# Patient Record
Sex: MALE | Race: WHITE | NOT HISPANIC OR LATINO | Employment: FULL TIME | URBAN - METROPOLITAN AREA
[De-identification: names, ages, dates, MRNs, and addresses within clinical notes are randomized per-mention and may not be internally consistent; named-entity substitution may affect disease eponyms.]

---

## 2019-09-16 ENCOUNTER — OFFICE VISIT (OUTPATIENT)
Dept: FAMILY MEDICINE | Facility: CLINIC | Age: 59
End: 2019-09-16
Payer: COMMERCIAL

## 2019-09-16 VITALS
OXYGEN SATURATION: 96 % | SYSTOLIC BLOOD PRESSURE: 126 MMHG | HEART RATE: 72 BPM | HEIGHT: 73 IN | DIASTOLIC BLOOD PRESSURE: 70 MMHG | BODY MASS INDEX: 33.66 KG/M2 | WEIGHT: 254 LBS

## 2019-09-16 DIAGNOSIS — B35.1 ONYCHOMYCOSIS: ICD-10-CM

## 2019-09-16 DIAGNOSIS — L71.9 ROSACEA: ICD-10-CM

## 2019-09-16 DIAGNOSIS — I10 ESSENTIAL HYPERTENSION: Primary | ICD-10-CM

## 2019-09-16 DIAGNOSIS — F41.1 GENERALIZED ANXIETY DISORDER: ICD-10-CM

## 2019-09-16 DIAGNOSIS — D68.51 FACTOR 5 LEIDEN MUTATION, HETEROZYGOUS: ICD-10-CM

## 2019-09-16 DIAGNOSIS — K29.70 GASTRITIS WITHOUT BLEEDING, UNSPECIFIED CHRONICITY, UNSPECIFIED GASTRITIS TYPE: ICD-10-CM

## 2019-09-16 PROCEDURE — 99214 OFFICE O/P EST MOD 30 MIN: CPT | Mod: S$GLB,,, | Performed by: FAMILY MEDICINE

## 2019-09-16 PROCEDURE — 3008F BODY MASS INDEX DOCD: CPT | Mod: S$GLB,,, | Performed by: FAMILY MEDICINE

## 2019-09-16 PROCEDURE — 99214 PR OFFICE/OUTPT VISIT, EST, LEVL IV, 30-39 MIN: ICD-10-PCS | Mod: S$GLB,,, | Performed by: FAMILY MEDICINE

## 2019-09-16 PROCEDURE — 3008F PR BODY MASS INDEX (BMI) DOCUMENTED: ICD-10-PCS | Mod: S$GLB,,, | Performed by: FAMILY MEDICINE

## 2019-09-16 RX ORDER — HYDROCORTISONE 25 MG/G
CREAM TOPICAL DAILY
Qty: 15 G | Refills: 2 | Status: SHIPPED | OUTPATIENT
Start: 2019-09-16

## 2019-09-16 RX ORDER — LORAZEPAM 0.5 MG/1
1 TABLET ORAL
COMMUNITY
End: 2019-09-17 | Stop reason: SDUPTHER

## 2019-09-16 RX ORDER — HYDROCHLOROTHIAZIDE 25 MG/1
1 TABLET ORAL DAILY
COMMUNITY
Start: 2019-07-17 | End: 2019-09-16 | Stop reason: SDUPTHER

## 2019-09-16 RX ORDER — GARLIC 1000 MG
1 CAPSULE ORAL DAILY
COMMUNITY

## 2019-09-16 RX ORDER — ATENOLOL 50 MG/1
1 TABLET ORAL 2 TIMES DAILY
COMMUNITY
Start: 2019-06-11 | End: 2019-09-16 | Stop reason: SDUPTHER

## 2019-09-16 RX ORDER — LOSARTAN POTASSIUM 50 MG/1
50 TABLET ORAL DAILY
Qty: 90 TABLET | Refills: 1 | Status: SHIPPED | OUTPATIENT
Start: 2019-09-16 | End: 2020-03-16 | Stop reason: SDUPTHER

## 2019-09-16 RX ORDER — IBUPROFEN 100 MG/5ML
1 SUSPENSION, ORAL (FINAL DOSE FORM) ORAL DAILY
COMMUNITY

## 2019-09-16 RX ORDER — PHENYLEPHRINE HCL 10 MG
1 TABLET ORAL DAILY
COMMUNITY

## 2019-09-16 RX ORDER — TITANIUM DIOXIDE, OCTINOXATE, ZINC OXIDE 4.61; 1.6; .78 G/40ML; G/40ML; G/40ML
1 CREAM TOPICAL DAILY
COMMUNITY

## 2019-09-16 RX ORDER — LOSARTAN POTASSIUM 50 MG/1
1 TABLET ORAL DAILY
COMMUNITY
Start: 2019-07-17 | End: 2019-09-16 | Stop reason: SDUPTHER

## 2019-09-16 RX ORDER — OMEPRAZOLE 40 MG/1
40 CAPSULE, DELAYED RELEASE ORAL DAILY
Qty: 90 CAPSULE | Refills: 1 | Status: SHIPPED | OUTPATIENT
Start: 2019-09-16 | End: 2020-09-03 | Stop reason: SDUPTHER

## 2019-09-16 RX ORDER — TURMERIC 100 %
1 POWDER (GRAM) MISCELLANEOUS DAILY
COMMUNITY

## 2019-09-16 RX ORDER — HYDROCHLOROTHIAZIDE 25 MG/1
25 TABLET ORAL DAILY
Qty: 90 TABLET | Refills: 1 | Status: SHIPPED | OUTPATIENT
Start: 2019-09-16 | End: 2020-07-01 | Stop reason: SDUPTHER

## 2019-09-16 RX ORDER — AMLODIPINE BESYLATE 10 MG/1
1 TABLET ORAL DAILY
COMMUNITY
Start: 2019-07-17 | End: 2019-09-16 | Stop reason: SDUPTHER

## 2019-09-16 RX ORDER — AMLODIPINE BESYLATE 10 MG/1
10 TABLET ORAL DAILY
Qty: 90 TABLET | Refills: 1 | Status: SHIPPED | OUTPATIENT
Start: 2019-09-16 | End: 2020-03-16 | Stop reason: SDUPTHER

## 2019-09-16 RX ORDER — ATENOLOL 50 MG/1
50 TABLET ORAL 2 TIMES DAILY
Qty: 90 TABLET | Refills: 1 | Status: SHIPPED | OUTPATIENT
Start: 2019-09-16 | End: 2020-03-16 | Stop reason: SDUPTHER

## 2019-09-16 RX ORDER — HYDROCORTISONE ACETATE 0.5 %
1 CREAM (GRAM) TOPICAL DAILY
COMMUNITY

## 2019-09-16 RX ORDER — OMEPRAZOLE 40 MG/1
40 CAPSULE, DELAYED RELEASE ORAL DAILY
Refills: 3 | COMMUNITY
Start: 2019-06-14 | End: 2019-09-16 | Stop reason: SDUPTHER

## 2019-09-17 DIAGNOSIS — F41.1 GENERALIZED ANXIETY DISORDER: Primary | ICD-10-CM

## 2019-09-17 RX ORDER — LORAZEPAM 0.5 MG/1
0.5 TABLET ORAL
Qty: 30 TABLET | Refills: 3 | Status: SHIPPED | OUTPATIENT
Start: 2019-09-17 | End: 2020-03-16 | Stop reason: SDUPTHER

## 2019-09-17 NOTE — TELEPHONE ENCOUNTER
----- Message from Melita Carney sent at 9/17/2019  1:50 PM CDT -----  vm- pt said that not all of his rx's were called in yesterday . Please give him a call

## 2019-10-16 ENCOUNTER — TELEPHONE (OUTPATIENT)
Dept: FAMILY MEDICINE | Facility: CLINIC | Age: 59
End: 2019-10-16

## 2019-12-17 ENCOUNTER — OFFICE VISIT (OUTPATIENT)
Dept: FAMILY MEDICINE | Facility: CLINIC | Age: 59
End: 2019-12-17
Payer: COMMERCIAL

## 2019-12-17 VITALS
SYSTOLIC BLOOD PRESSURE: 136 MMHG | WEIGHT: 246 LBS | BODY MASS INDEX: 32.6 KG/M2 | HEART RATE: 80 BPM | HEIGHT: 73 IN | DIASTOLIC BLOOD PRESSURE: 84 MMHG

## 2019-12-17 DIAGNOSIS — K21.9 GASTROESOPHAGEAL REFLUX DISEASE WITHOUT ESOPHAGITIS: ICD-10-CM

## 2019-12-17 DIAGNOSIS — I10 ESSENTIAL HYPERTENSION: Primary | ICD-10-CM

## 2019-12-17 DIAGNOSIS — D68.51 FACTOR 5 LEIDEN MUTATION, HETEROZYGOUS: ICD-10-CM

## 2019-12-17 DIAGNOSIS — F41.1 GENERALIZED ANXIETY DISORDER: ICD-10-CM

## 2019-12-17 PROBLEM — K29.70 GASTRITIS: Status: RESOLVED | Noted: 2019-09-16 | Resolved: 2019-12-17

## 2019-12-17 PROCEDURE — 3008F PR BODY MASS INDEX (BMI) DOCUMENTED: ICD-10-PCS | Mod: S$GLB,,, | Performed by: FAMILY MEDICINE

## 2019-12-17 PROCEDURE — 3008F BODY MASS INDEX DOCD: CPT | Mod: S$GLB,,, | Performed by: FAMILY MEDICINE

## 2019-12-17 PROCEDURE — 99214 OFFICE O/P EST MOD 30 MIN: CPT | Mod: S$GLB,,, | Performed by: FAMILY MEDICINE

## 2019-12-17 PROCEDURE — 99214 PR OFFICE/OUTPT VISIT, EST, LEVL IV, 30-39 MIN: ICD-10-PCS | Mod: S$GLB,,, | Performed by: FAMILY MEDICINE

## 2019-12-17 NOTE — PROGRESS NOTES
SUBJECTIVE:    Patient ID: Otto Dawson is a 59 y.o. male.    Chief Complaint: Follow-up (did not bring bottles, states he has enough to last until the next OV -ac )    This 59-year-old male complains of some sinus congestion.  He has been using saline flushes to his nose and has had minimal discharge. He denies any fevers chills or cough.    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  He does not do much exercise outside of his job.  He is willing to go join a gym with a friend.    He has a history of a anxiety disorder with panic attacks.  He uses lorazepam only rarely for panic attacks.    He follows up yearly for prostate exams with  who does the PSA tests      No visits with results within 6 Month(s) from this visit.   Latest known visit with results is:   No results found for any previous visit.       Past Medical History:   Diagnosis Date    Anxiety     Arthritis     Depression     Factor 5 Leiden mutation, heterozygous     Gastritis 9/16/2019    Generalized anxiety disorder     History of nuclear stress test 2014    Hyperlipidemia     Hypertension      Past Surgical History:   Procedure Laterality Date    COLONOSCOPY  2018    Dr Don-rtc 5 yrs     HERNIA REPAIR      SINUS SURGERY  2018    nasal septoplasty/balloon sinuplasty- Dr Roman     Family History   Problem Relation Age of Onset    Heart disease Father        Marital Status:   Alcohol History:  reports that he drinks alcohol.  Tobacco History:  reports that he quit smoking about 21 years ago. His smoking use included cigarettes. He has never used smokeless tobacco.  Drug History:  reports that he does not use drugs.    Review of patient's allergies indicates:   Allergen Reactions    Cefuroxime axetil     Ciprofloxacin Other (See Comments)    Sulfacetamide sodium-sulfur Other (See Comments)    Tetracycline hcl Other (See Comments)       Current  Outpatient Medications:     amLODIPine (NORVASC) 10 MG tablet, Take 1 tablet (10 mg total) by mouth once daily., Disp: 90 tablet, Rfl: 1    ascorbic acid, vitamin C, (VITAMIN C) 1000 MG tablet, 1 tablet once daily., Disp: , Rfl:     atenolol (TENORMIN) 50 MG tablet, Take 1 tablet (50 mg total) by mouth 2 (two) times daily., Disp: 90 tablet, Rfl: 1    CALCIUM-MAGNESIUM ORAL, 1 tablet Daily., Disp: , Rfl:     cinnamon bark (CINNAMON) 500 mg capsule, 1 tablet Daily., Disp: , Rfl:     cranberry 400 mg Cap, 1 capsule once daily., Disp: , Rfl:     fluoxetine (PROZAC) 20 MG capsule, Take 1 capsule (20 mg total) by mouth once daily., Disp: 90 capsule, Rfl: 3    garlic 1,000 mg Cap, 1 capsule once daily., Disp: , Rfl:     glucosam reed dip-chondroit-C-Mn 585-236-45-3 mg Cap, 1 tablet once daily., Disp: , Rfl:     hydroCHLOROthiazide (HYDRODIURIL) 25 MG tablet, Take 1 tablet (25 mg total) by mouth once daily., Disp: 90 tablet, Rfl: 1    hydrocortisone 2.5 % cream, Apply topically once daily., Disp: 15 g, Rfl: 2    LORazepam (ATIVAN) 0.5 MG tablet, Take 1 tablet (0.5 mg total) by mouth as needed., Disp: 30 tablet, Rfl: 3    losartan (COZAAR) 50 MG tablet, Take 1 tablet (50 mg total) by mouth once daily., Disp: 90 tablet, Rfl: 1    mv-mins-folic-lycopene-ginkgo (ONE-A-DAY MEN 50 PLUS, GINKGO,) 400-300-120 mcg-mcg-mg Tab, 1 tablet once daily., Disp: , Rfl:     omeprazole (PRILOSEC) 40 MG capsule, Take 1 capsule (40 mg total) by mouth once daily., Disp: 90 capsule, Rfl: 1    PHYTOSTEROL ORAL, 1 tablet once daily., Disp: , Rfl:     turmeric, bulk, 100 % Powd, 1 tablet once daily., Disp: , Rfl:     vitamin B comp with C no.4 (SUPER B COMPLEX + C) 150 mg Tab, 1 tablet once daily., Disp: , Rfl:     Review of Systems   Constitutional: Negative for appetite change, chills, fatigue, fever and unexpected weight change.   HENT: Negative for congestion, ear pain and trouble swallowing.    Eyes: Negative for pain, discharge  "and visual disturbance.   Respiratory: Negative for apnea, cough, shortness of breath and wheezing.    Cardiovascular: Negative for chest pain and leg swelling.        Fleeting  Chest pains    Gastrointestinal: Negative for abdominal pain, blood in stool, constipation, diarrhea, nausea and vomiting.   Endocrine: Negative for cold intolerance, heat intolerance and polydipsia.   Genitourinary: Negative for dysuria, hematuria, testicular pain and urgency.        Nocturia 1  X nite, sees Dr Monique for PSA   Musculoskeletal: Negative for gait problem, joint swelling and myalgias.   Neurological: Negative for dizziness, seizures and numbness.   Psychiatric/Behavioral: Negative for agitation, behavioral problems and hallucinations. The patient is not nervous/anxious.           Objective:      Vitals:    12/17/19 1429   BP: 136/84   Pulse: 80   Weight: 111.6 kg (246 lb)   Height: 6' 1" (1.854 m)     Body mass index is 32.46 kg/m².  Physical Exam   Constitutional: He is oriented to person, place, and time. He appears well-developed and well-nourished.   HENT:   Head: Normocephalic and atraumatic.   Right Ear: External ear normal.   Left Ear: External ear normal.   Nose: Nose normal.   Mouth/Throat: Oropharynx is clear and moist.   Eyes: Pupils are equal, round, and reactive to light. EOM are normal.   Neck: Normal range of motion. Neck supple. Carotid bruit is not present. No thyromegaly present.   Cardiovascular: Normal rate, regular rhythm, normal heart sounds and intact distal pulses.   No murmur heard.  Pulmonary/Chest: Effort normal and breath sounds normal. He has no wheezes. He has no rales.   Abdominal: Soft. Bowel sounds are normal. He exhibits no distension. There is no hepatosplenomegaly. There is no tenderness.   Musculoskeletal: Normal range of motion. He exhibits no tenderness or deformity.        Lumbar back: Normal. He exhibits no pain and no spasm.   Bends 90 degrees at  waist, shoulders have full range " of motion, knees are crepitant bilaterally.  There is no pitting edema peripherally   Lymphadenopathy:     He has no cervical adenopathy.   Neurological: He is alert and oriented to person, place, and time. No cranial nerve deficit. Coordination normal.   Skin: Skin is warm and dry. No rash noted.   Psychiatric: He has a normal mood and affect. His behavior is normal. Judgment and thought content normal.   Nursing note and vitals reviewed.        Assessment:       1. Essential hypertension    2. Generalized anxiety disorder    3. Factor 5 Leiden mutation, heterozygous    4. Gastroesophageal reflux disease without esophagitis         Plan:       Essential hypertension  Well controlled on current medications  Generalized anxiety disorder  Continue p.r.n. use of lorazepam.  Continue fluoxetine  Factor 5 Leiden mutation, heterozygous  He has never had a blood clot  Gastroesophageal reflux disease without esophagitis  Using omeprazole only rarely  Repeat lab work in March.  Follow up in about 3 months (around 3/17/2020) for Hypertension.

## 2020-03-16 DIAGNOSIS — F41.1 GENERALIZED ANXIETY DISORDER: ICD-10-CM

## 2020-03-16 DIAGNOSIS — I10 ESSENTIAL HYPERTENSION: ICD-10-CM

## 2020-03-16 RX ORDER — LORAZEPAM 0.5 MG/1
0.5 TABLET ORAL
Qty: 30 TABLET | Refills: 3 | Status: SHIPPED | OUTPATIENT
Start: 2020-03-16 | End: 2020-09-03 | Stop reason: SDUPTHER

## 2020-03-16 RX ORDER — ATENOLOL 50 MG/1
50 TABLET ORAL 2 TIMES DAILY
Qty: 90 TABLET | Refills: 1 | Status: SHIPPED | OUTPATIENT
Start: 2020-03-16 | End: 2020-04-23 | Stop reason: SDUPTHER

## 2020-03-16 RX ORDER — AMLODIPINE BESYLATE 10 MG/1
10 TABLET ORAL DAILY
Qty: 90 TABLET | Refills: 1 | Status: SHIPPED | OUTPATIENT
Start: 2020-03-16 | End: 2020-09-03 | Stop reason: SDUPTHER

## 2020-03-16 RX ORDER — LOSARTAN POTASSIUM 50 MG/1
50 TABLET ORAL DAILY
Qty: 90 TABLET | Refills: 1 | Status: SHIPPED | OUTPATIENT
Start: 2020-03-16 | End: 2020-09-03 | Stop reason: SDUPTHER

## 2020-03-16 NOTE — TELEPHONE ENCOUNTER
----- Message from Desiree Traore sent at 3/16/2020 11:46 AM CDT -----  Pt cancelled needs refills on Atenolol, HCL, Losartan, Amlodipine.  He is completely out of Atenolol but would like more fills on all with 90 days supply [facility] ([facility] [address]).  Refill on Ativan due to high stress also to [facility]   # [phone]

## 2020-04-20 ENCOUNTER — TELEPHONE (OUTPATIENT)
Dept: FAMILY MEDICINE | Facility: CLINIC | Age: 60
End: 2020-04-20

## 2020-04-22 ENCOUNTER — TELEPHONE (OUTPATIENT)
Dept: FAMILY MEDICINE | Facility: CLINIC | Age: 60
End: 2020-04-22

## 2020-04-22 NOTE — TELEPHONE ENCOUNTER
Pt stated if the virtual visit does not work in the morning to please give him a call on [phone] so he could still have his visit

## 2020-04-23 ENCOUNTER — OFFICE VISIT (OUTPATIENT)
Dept: FAMILY MEDICINE | Facility: CLINIC | Age: 60
End: 2020-04-23

## 2020-04-23 DIAGNOSIS — I10 ESSENTIAL HYPERTENSION: ICD-10-CM

## 2020-04-23 DIAGNOSIS — K29.70 GASTRITIS WITHOUT BLEEDING, UNSPECIFIED CHRONICITY, UNSPECIFIED GASTRITIS TYPE: ICD-10-CM

## 2020-04-23 DIAGNOSIS — F41.1 GENERALIZED ANXIETY DISORDER: Primary | ICD-10-CM

## 2020-04-23 PROCEDURE — 99213 OFFICE O/P EST LOW 20 MIN: CPT | Mod: 95,,, | Performed by: PHYSICIAN ASSISTANT

## 2020-04-23 PROCEDURE — 99213 PR OFFICE/OUTPT VISIT, EST, LEVL III, 20-29 MIN: ICD-10-PCS | Mod: 95,,, | Performed by: PHYSICIAN ASSISTANT

## 2020-04-23 RX ORDER — ATENOLOL 50 MG/1
50 TABLET ORAL 2 TIMES DAILY
Qty: 90 TABLET | Refills: 1 | Status: SHIPPED | OUTPATIENT
Start: 2020-04-23 | End: 2020-05-26 | Stop reason: SDUPTHER

## 2020-04-23 RX ORDER — UBIDECARENONE 30 MG
30 CAPSULE ORAL 3 TIMES DAILY
COMMUNITY

## 2020-04-23 RX ORDER — CHOLECALCIFEROL (VITAMIN D3) 25 MCG
1000 TABLET ORAL DAILY
COMMUNITY

## 2020-04-23 NOTE — PATIENT INSTRUCTIONS

## 2020-04-23 NOTE — PROGRESS NOTES
Subjective:        The chief complaint leading to consultation is: 6 month checkup  The patient location is:  Home  Visit type: Virtual visit with synchronous audio/video or audio only  This was a video visit in lieu of in-person visit due to the coronavirus emergency. Patient acknowledged and consented to the video visit encounter.     58 yo wm presents for regular checkup. Via Virtual telemed conference. He reports that he is still able to work at his [employer]. Does gardening and small chores. Not in close proximity to anyone. He reports that he has been compliant with medications as prescribed. Does report some right hip pain for which he has been using some IBU. Considering seeing Chiro. Still talking with his therapist once a week now as well. Virtually. Needs refills of atenolol.      Past Surgical History:   Procedure Laterality Date    COLONOSCOPY  2018    Dr Don-rtc 5 yrs     HERNIA REPAIR      SINUS SURGERY  2018    nasal septoplasty/balloon sinuplasty- Dr Roman     Past Medical History:   Diagnosis Date    Anxiety     Arthritis     Depression     Factor 5 Leiden mutation, heterozygous     Gastritis 9/16/2019    Generalized anxiety disorder     History of nuclear stress test 2014    Hyperlipidemia     Hypertension      Family History   Problem Relation Age of Onset    Heart disease Father         Social History:   Marital Status: Single  Alcohol History:  reports that he drinks alcohol.  Tobacco History:  reports that he quit smoking about 22 years ago. His smoking use included cigarettes. He has never used smokeless tobacco.  Drug History:  reports that he does not use drugs.    Review of patient's allergies indicates:   Allergen Reactions    Cefuroxime axetil     Ciprofloxacin Other (See Comments)    Sulfacetamide sodium-sulfur Other (See Comments)    Tetracycline hcl Other (See Comments)       Current Outpatient Medications   Medication Sig Dispense Refill    amLODIPine (NORVASC)  10 MG tablet Take 1 tablet (10 mg total) by mouth once daily. 90 tablet 1    ascorbic acid, vitamin C, (VITAMIN C) 1000 MG tablet 1 tablet once daily.      atenoloL (TENORMIN) 50 MG tablet Take 1 tablet (50 mg total) by mouth 2 (two) times daily. 90 tablet 1    CALCIUM-MAGNESIUM ORAL 1 tablet Daily.      cinnamon bark (CINNAMON) 500 mg capsule 1 tablet Daily.      co-enzyme Q-10 30 mg capsule Take 30 mg by mouth 3 (three) times daily.      cranberry 400 mg Cap 1 capsule once daily.      garlic 1,000 mg Cap 1 capsule once daily.      hydroCHLOROthiazide (HYDRODIURIL) 25 MG tablet Take 1 tablet (25 mg total) by mouth once daily. 90 tablet 1    hydrocortisone 2.5 % cream Apply topically once daily. 15 g 2    LORazepam (ATIVAN) 0.5 MG tablet Take 1 tablet (0.5 mg total) by mouth as needed. 30 tablet 3    losartan (COZAAR) 50 MG tablet Take 1 tablet (50 mg total) by mouth once daily. 90 tablet 1    mv-mins-folic-lycopene-ginkgo (ONE-A-DAY MEN 50 PLUS, GINKGO,) 400-300-120 mcg-mcg-mg Tab 1 tablet once daily.      omeprazole (PRILOSEC) 40 MG capsule Take 1 capsule (40 mg total) by mouth once daily. 90 capsule 1    PHYTOSTEROL ORAL 1 tablet once daily.      turmeric, bulk, 100 % Powd 1 tablet once daily.      vitamin B comp with C no.4 (SUPER B COMPLEX + C) 150 mg Tab 1 tablet once daily.      vitamin D (VITAMIN D3) 1000 units Tab Take 1,000 Units by mouth once daily.      glucosam reed dip-chondroit-C-Mn 805-569-35-3 mg Cap 1 tablet once daily.       No current facility-administered medications for this visit.        Review of Systems   Constitutional: Negative for activity change, fatigue, fever and unexpected weight change.   HENT: Negative for congestion.    Respiratory: Negative for apnea, cough, chest tightness and shortness of breath.    Cardiovascular: Negative for chest pain and palpitations.   Gastrointestinal: Negative for abdominal distention and abdominal pain.   Genitourinary: Negative for  difficulty urinating and dysuria.   Musculoskeletal: Positive for arthralgias. Negative for back pain.   Neurological: Negative for dizziness and weakness.         Objective:        Physical Exam:   Physical Exam   Constitutional: He appears well-developed and well-nourished. No distress.   HENT:   Head: Normocephalic and atraumatic.   Neck: Normal range of motion.   Pulmonary/Chest: Effort normal. No respiratory distress.            Assessment:       1. Generalized anxiety disorder    2. Gastritis without bleeding, unspecified chronicity, unspecified gastritis type    3. Essential hypertension      Plan:   Generalized anxiety disorder  Comments:  mood and anxiety stable at this time. continue as is.    Gastritis without bleeding, unspecified chronicity, unspecified gastritis type    Essential hypertension  Comments:  Well managed. requests refills. Plans to get labs done ASAP  Orders:  -     atenoloL (TENORMIN) 50 MG tablet; Take 1 tablet (50 mg total) by mouth 2 (two) times daily.  Dispense: 90 tablet; Refill: 1      Follow up in about 6 months (around 10/23/2020) for BP Check-Up.    Total time spent with patient: 20min    Each patient to whom he or she provides medical services by telemedicine is:  (1) informed of the relationship between the physician and patient and the respective role of any other health care provider with respect to management of the patient; and (2) notified that he or she may decline to receive medical services by telemedicine and may withdraw from such care at any time.    This note was created using [employer] voice recognition software that occasionally misinterprets phrases or words.

## 2020-05-26 DIAGNOSIS — I10 ESSENTIAL HYPERTENSION: ICD-10-CM

## 2020-05-26 RX ORDER — ATENOLOL 50 MG/1
50 TABLET ORAL 2 TIMES DAILY
Qty: 90 TABLET | Refills: 1 | Status: SHIPPED | OUTPATIENT
Start: 2020-05-26 | End: 2020-09-03 | Stop reason: SDUPTHER

## 2020-05-26 NOTE — TELEPHONE ENCOUNTER
----- Message from Nader Cortez sent at 5/26/2020 11:06 AM CDT -----  Contact: Otto Dawson  Pt says that he needs refills for 90 day supply on his Atenolol   Send to [facility]  Pt# [phone]

## 2020-07-01 DIAGNOSIS — I10 ESSENTIAL HYPERTENSION: ICD-10-CM

## 2020-07-01 RX ORDER — HYDROCHLOROTHIAZIDE 25 MG/1
25 TABLET ORAL DAILY
Qty: 90 TABLET | Refills: 1 | Status: SHIPPED | OUTPATIENT
Start: 2020-07-01 | End: 2020-09-03 | Stop reason: SDUPTHER

## 2020-07-01 NOTE — TELEPHONE ENCOUNTER
----- Message from Talia Bowman sent at 7/1/2020  9:51 AM CDT -----  Regarding: refills  hydroCHLOROthiazide   [facility]   [phone]

## 2020-09-03 ENCOUNTER — OFFICE VISIT (OUTPATIENT)
Dept: FAMILY MEDICINE | Facility: CLINIC | Age: 60
End: 2020-09-03
Payer: COMMERCIAL

## 2020-09-03 VITALS
TEMPERATURE: 98 F | SYSTOLIC BLOOD PRESSURE: 126 MMHG | HEIGHT: 73 IN | HEART RATE: 76 BPM | BODY MASS INDEX: 32.47 KG/M2 | DIASTOLIC BLOOD PRESSURE: 84 MMHG | WEIGHT: 245 LBS

## 2020-09-03 DIAGNOSIS — D68.51 FACTOR 5 LEIDEN MUTATION, HETEROZYGOUS: ICD-10-CM

## 2020-09-03 DIAGNOSIS — K21.9 GASTROESOPHAGEAL REFLUX DISEASE WITHOUT ESOPHAGITIS: ICD-10-CM

## 2020-09-03 DIAGNOSIS — L74.0 HEAT RASH: ICD-10-CM

## 2020-09-03 DIAGNOSIS — K29.70 GASTRITIS WITHOUT BLEEDING, UNSPECIFIED CHRONICITY, UNSPECIFIED GASTRITIS TYPE: ICD-10-CM

## 2020-09-03 DIAGNOSIS — F41.1 GENERALIZED ANXIETY DISORDER: ICD-10-CM

## 2020-09-03 DIAGNOSIS — K58.9 IRRITABLE BOWEL SYNDROME WITHOUT DIARRHEA: Primary | ICD-10-CM

## 2020-09-03 DIAGNOSIS — I10 ESSENTIAL HYPERTENSION: ICD-10-CM

## 2020-09-03 PROCEDURE — 3079F DIAST BP 80-89 MM HG: CPT | Mod: S$GLB,,, | Performed by: FAMILY MEDICINE

## 2020-09-03 PROCEDURE — 99213 OFFICE O/P EST LOW 20 MIN: CPT | Mod: S$GLB,,, | Performed by: FAMILY MEDICINE

## 2020-09-03 PROCEDURE — 3074F PR MOST RECENT SYSTOLIC BLOOD PRESSURE < 130 MM HG: ICD-10-PCS | Mod: S$GLB,,, | Performed by: FAMILY MEDICINE

## 2020-09-03 PROCEDURE — 3008F BODY MASS INDEX DOCD: CPT | Mod: S$GLB,,, | Performed by: FAMILY MEDICINE

## 2020-09-03 PROCEDURE — 3079F PR MOST RECENT DIASTOLIC BLOOD PRESSURE 80-89 MM HG: ICD-10-PCS | Mod: S$GLB,,, | Performed by: FAMILY MEDICINE

## 2020-09-03 PROCEDURE — 99213 PR OFFICE/OUTPT VISIT, EST, LEVL III, 20-29 MIN: ICD-10-PCS | Mod: S$GLB,,, | Performed by: FAMILY MEDICINE

## 2020-09-03 PROCEDURE — 3074F SYST BP LT 130 MM HG: CPT | Mod: S$GLB,,, | Performed by: FAMILY MEDICINE

## 2020-09-03 PROCEDURE — 3008F PR BODY MASS INDEX (BMI) DOCUMENTED: ICD-10-PCS | Mod: S$GLB,,, | Performed by: FAMILY MEDICINE

## 2020-09-03 RX ORDER — ATENOLOL 50 MG/1
50 TABLET ORAL 2 TIMES DAILY
Qty: 90 TABLET | Refills: 1 | Status: SHIPPED | OUTPATIENT
Start: 2020-09-03 | End: 2020-12-28 | Stop reason: SDUPTHER

## 2020-09-03 RX ORDER — HYDROCHLOROTHIAZIDE 25 MG/1
25 TABLET ORAL DAILY
Qty: 90 TABLET | Refills: 1 | Status: SHIPPED | OUTPATIENT
Start: 2020-09-03 | End: 2021-03-10 | Stop reason: SDUPTHER

## 2020-09-03 RX ORDER — LORAZEPAM 0.5 MG/1
0.5 TABLET ORAL
Qty: 30 TABLET | Refills: 3 | Status: SHIPPED | OUTPATIENT
Start: 2020-09-03 | End: 2021-03-10 | Stop reason: SDUPTHER

## 2020-09-03 RX ORDER — DICYCLOMINE HYDROCHLORIDE 20 MG/1
20 TABLET ORAL 3 TIMES DAILY
Qty: 60 TABLET | Refills: 2 | Status: SHIPPED | OUTPATIENT
Start: 2020-09-03 | End: 2020-10-03

## 2020-09-03 RX ORDER — LOSARTAN POTASSIUM 50 MG/1
50 TABLET ORAL DAILY
Qty: 90 TABLET | Refills: 1 | Status: SHIPPED | OUTPATIENT
Start: 2020-09-03 | End: 2021-03-10 | Stop reason: SDUPTHER

## 2020-09-03 RX ORDER — OMEPRAZOLE 40 MG/1
40 CAPSULE, DELAYED RELEASE ORAL DAILY
Qty: 90 CAPSULE | Refills: 1 | Status: SHIPPED | OUTPATIENT
Start: 2020-09-03 | End: 2021-03-10 | Stop reason: SDUPTHER

## 2020-09-03 RX ORDER — AMLODIPINE BESYLATE 10 MG/1
10 TABLET ORAL DAILY
Qty: 90 TABLET | Refills: 1 | Status: SHIPPED | OUTPATIENT
Start: 2020-09-03 | End: 2021-03-10 | Stop reason: SDUPTHER

## 2020-09-03 NOTE — PROGRESS NOTES
Subjective:       Patient ID: Otto Dawson is a 59 y.o. male.    Chief Complaint: Irritable Bowel Syndrome (did not bring bottles, only wants refills on certain meds ac)    This 59-year-old male complains of several months worth of lower abdominal pain.  He feels relief after a bowel movement.  His stools have been decreasing in caliber for the last month.  He has 3-4 stools per day but not diarrhea.  He goes every day and does not have constipation or melena.  He was diagnosed with irritable bowel syndrome in the year 2000.  Back 10 he responded well to Bentyl.    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  Complains of groin itching but no red rash.    Review of patient's allergies indicates:   Allergen Reactions    Cefuroxime axetil     Ciprofloxacin Other (See Comments)    [place] Itching    Sulfacetamide sodium-sulfur Other (See Comments)    Tetracycline hcl Other (See Comments)         Current Outpatient Medications:     amLODIPine (NORVASC) 10 MG tablet, Take 1 tablet (10 mg total) by mouth once daily., Disp: 90 tablet, Rfl: 1    ascorbic acid, vitamin C, (VITAMIN C) 1000 MG tablet, 1 tablet once daily., Disp: , Rfl:     atenoloL (TENORMIN) 50 MG tablet, Take 1 tablet (50 mg total) by mouth 2 (two) times daily., Disp: 90 tablet, Rfl: 1    CALCIUM-MAGNESIUM ORAL, 1 tablet Daily., Disp: , Rfl:     cinnamon bark (CINNAMON) 500 mg capsule, 1 tablet Daily., Disp: , Rfl:     co-enzyme Q-10 30 mg capsule, Take 30 mg by mouth 3 (three) times daily., Disp: , Rfl:     cranberry 400 mg Cap, 1 capsule once daily., Disp: , Rfl:     dicyclomine (BENTYL) 20 mg tablet, Take 1 tablet (20 mg total) by mouth 3 (three) times daily., Disp: 60 tablet, Rfl: 2    garlic 1,000 mg Cap, 1 capsule once daily., Disp: , Rfl:     glucosam reed dip-chondroit-C-Mn 211-972-33-3 mg Cap, 1 tablet once daily., Disp: , Rfl:     hydroCHLOROthiazide (HYDRODIURIL) 25 MG tablet, Take 1 tablet (25  mg total) by mouth once daily., Disp: 90 tablet, Rfl: 1    hydrocortisone 2.5 % cream, Apply topically once daily., Disp: 15 g, Rfl: 2    LORazepam (ATIVAN) 0.5 MG tablet, Take 1 tablet (0.5 mg total) by mouth as needed., Disp: 30 tablet, Rfl: 3    losartan (COZAAR) 50 MG tablet, Take 1 tablet (50 mg total) by mouth once daily., Disp: 90 tablet, Rfl: 1    mv-mins-folic-lycopene-ginkgo (ONE-A-DAY MEN 50 PLUS, GINKGO,) 400-300-120 mcg-mcg-mg Tab, 1 tablet once daily., Disp: , Rfl:     omeprazole (PRILOSEC) 40 MG capsule, Take 1 capsule (40 mg total) by mouth once daily., Disp: 90 capsule, Rfl: 1    PHYTOSTEROL ORAL, 1 tablet once daily., Disp: , Rfl:     turmeric, bulk, 100 % Powd, 1 tablet once daily., Disp: , Rfl:     vitamin B comp with C no.4 (SUPER B COMPLEX + C) 150 mg Tab, 1 tablet once daily., Disp: , Rfl:     vitamin D (VITAMIN D3) 1000 units Tab, Take 1,000 Units by mouth once daily., Disp: , Rfl:     No results found for: WBC, HGB, HCT, PLT, CHOL, TRIG, HDL, LDLDIRECT, ALT, AST, NA, K, CL, CREATININE, BUN, CO2, TSH, PSA, INR, GLUF, HGBA1C, MICROALBUR    Review of Systems   Constitutional: Negative for activity change, chills, fatigue, fever and unexpected weight change.   HENT: Negative for congestion, ear discharge, ear pain, sinus pain and sore throat.    Eyes: Negative for pain, discharge, itching and visual disturbance.   Respiratory: Negative for cough, chest tightness, shortness of breath and wheezing.    Cardiovascular: Negative for chest pain, palpitations and leg swelling.   Gastrointestinal: Positive for abdominal distention (bloating), abdominal pain (generalized lower abdominal pain) and nausea. Negative for blood in stool, constipation, diarrhea, rectal pain and vomiting.   Genitourinary: Negative for difficulty urinating, dysuria, flank pain, frequency and hematuria.   Musculoskeletal: Positive for arthralgias (bilateral hands) and back pain (sciatica). Negative for neck pain and neck  stiffness.   Skin: Negative for color change, pallor, rash and wound.   Neurological: Negative for dizziness, syncope, light-headedness, numbness and headaches.   Psychiatric/Behavioral: Negative for agitation and behavioral problems. The patient is not nervous/anxious.        Objective:      Physical Exam  Constitutional:       Appearance: Normal appearance. He is obese.   HENT:      Head: Normocephalic and atraumatic.      Right Ear: Tympanic membrane, ear canal and external ear normal.      Left Ear: Tympanic membrane, ear canal and external ear normal.      Nose: Nose normal.      Mouth/Throat:      Mouth: Mucous membranes are dry.      Pharynx: Oropharynx is clear.   Eyes:      Extraocular Movements: Extraocular movements intact.      Conjunctiva/sclera: Conjunctivae normal.      Pupils: Pupils are equal, round, and reactive to light.   Neck:      Musculoskeletal: Normal range of motion and neck supple.   Cardiovascular:      Rate and Rhythm: Normal rate and regular rhythm.      Pulses: Normal pulses.      Heart sounds: Normal heart sounds.   Pulmonary:      Effort: Pulmonary effort is normal. No respiratory distress.      Breath sounds: Normal breath sounds. No stridor. No wheezing.   Chest:      Chest wall: No tenderness.   Abdominal:      General: Abdomen is flat. Bowel sounds are normal. There is no distension.      Palpations: Abdomen is soft. There is no mass.      Tenderness: There is abdominal tenderness (epigastric).   Musculoskeletal: Normal range of motion.         General: No swelling or tenderness.      Right lower leg: No edema.      Left lower leg: No edema.   Skin:     General: Skin is warm and dry.      Coloration: Skin is not pale.      Findings: No erythema.      Comments: No redness noted in the intertriginous areas of the groin, mild scrotal erythema   Neurological:      Mental Status: He is alert.   Psychiatric:         Mood and Affect: Mood normal.         Behavior: Behavior normal.          Thought Content: Thought content normal.         Judgment: Judgment normal.         Assessment:       1. Irritable bowel syndrome without diarrhea    2. Essential hypertension    3. Generalized anxiety disorder    4. Essential hypertension    5. Gastritis without bleeding, unspecified chronicity, unspecified gastritis type    6. Factor 5 Leiden mutation, heterozygous    7. Gastroesophageal reflux disease without esophagitis    8. Heat rash        Plan:       1.  Irritable bowel syndrome-had bentyl  20 mg p.o. t.i.d.    p.r.n.  2.  Blood pressure well controlled  3.  Anxiety-use lorazepam p.r.n., anxiety can worsen irritable bowel syndrome  8.  Heat  rash-try gold Bond powder or Ammens medicated powder

## 2020-12-28 DIAGNOSIS — I10 ESSENTIAL HYPERTENSION: ICD-10-CM

## 2020-12-28 RX ORDER — ATENOLOL 50 MG/1
50 TABLET ORAL 2 TIMES DAILY
Qty: 90 TABLET | Refills: 1 | Status: SHIPPED | OUTPATIENT
Start: 2020-12-28 | End: 2021-03-10 | Stop reason: SDUPTHER

## 2020-12-28 NOTE — TELEPHONE ENCOUNTER
----- Message from Desiree Traore sent at 12/28/2020 12:13 PM CST -----  Pt calling for refill Atenolol 50 mg qty 180 to [facility] on  [facility] # [phone]

## 2021-02-24 ENCOUNTER — TELEPHONE (OUTPATIENT)
Dept: FAMILY MEDICINE | Facility: CLINIC | Age: 61
End: 2021-02-24

## 2021-02-24 DIAGNOSIS — Z79.899 ENCOUNTER FOR LONG-TERM (CURRENT) USE OF OTHER MEDICATIONS: ICD-10-CM

## 2021-02-24 DIAGNOSIS — I10 ESSENTIAL HYPERTENSION: ICD-10-CM

## 2021-02-24 DIAGNOSIS — Z00.00 ROUTINE GENERAL MEDICAL EXAMINATION AT A HEALTH CARE FACILITY: Primary | ICD-10-CM

## 2021-02-24 DIAGNOSIS — Z12.5 SPECIAL SCREENING FOR MALIGNANT NEOPLASM OF PROSTATE: ICD-10-CM

## 2021-03-05 LAB
ALBUMIN SERPL-MCNC: 4.7 G/DL (ref 3.6–5.1)
ALBUMIN/GLOB SERPL: 1.9 (CALC) (ref 1–2.5)
ALP SERPL-CCNC: 65 U/L (ref 35–144)
ALT SERPL-CCNC: 22 U/L (ref 9–46)
APPEARANCE UR: CLEAR
AST SERPL-CCNC: 18 U/L (ref 10–35)
BACTERIA #/AREA URNS HPF: ABNORMAL /HPF
BACTERIA UR CULT: ABNORMAL
BASOPHILS # BLD AUTO: 30 CELLS/UL (ref 0–200)
BASOPHILS NFR BLD AUTO: 0.5 %
BILIRUB SERPL-MCNC: 1.1 MG/DL (ref 0.2–1.2)
BILIRUB UR QL STRIP: NEGATIVE
BUN SERPL-MCNC: 18 MG/DL (ref 7–25)
BUN/CREAT SERPL: ABNORMAL (CALC) (ref 6–22)
CALCIUM SERPL-MCNC: 9.6 MG/DL (ref 8.6–10.3)
CHLORIDE SERPL-SCNC: 100 MMOL/L (ref 98–110)
CHOLEST SERPL-MCNC: 196 MG/DL
CHOLEST/HDLC SERPL: 5.6 (CALC)
CO2 SERPL-SCNC: 32 MMOL/L (ref 20–32)
COLOR UR: ABNORMAL
CREAT SERPL-MCNC: 1.3 MG/DL (ref 0.7–1.33)
EOSINOPHIL # BLD AUTO: 228 CELLS/UL (ref 15–500)
EOSINOPHIL NFR BLD AUTO: 3.8 %
ERYTHROCYTE [DISTWIDTH] IN BLOOD BY AUTOMATED COUNT: 12.6 % (ref 11–15)
GFRSERPLBLD MDRD-ARVRAT: 60 ML/MIN/1.73M2
GLOBULIN SER CALC-MCNC: 2.5 G/DL (CALC) (ref 1.9–3.7)
GLUCOSE SERPL-MCNC: 109 MG/DL (ref 65–99)
GLUCOSE UR QL STRIP: NEGATIVE
HCT VFR BLD AUTO: 46.6 % (ref 38.5–50)
HDLC SERPL-MCNC: 35 MG/DL
HGB BLD-MCNC: 15.5 G/DL (ref 13.2–17.1)
HGB UR QL STRIP: NEGATIVE
HYALINE CASTS #/AREA URNS LPF: ABNORMAL /LPF
KETONES UR QL STRIP: ABNORMAL
LDLC SERPL CALC-MCNC: 121 MG/DL (CALC)
LEUKOCYTE ESTERASE UR QL STRIP: NEGATIVE
LYMPHOCYTES # BLD AUTO: 1146 CELLS/UL (ref 850–3900)
LYMPHOCYTES NFR BLD AUTO: 19.1 %
MCH RBC QN AUTO: 29.6 PG (ref 27–33)
MCHC RBC AUTO-ENTMCNC: 33.3 G/DL (ref 32–36)
MCV RBC AUTO: 89.1 FL (ref 80–100)
MONOCYTES # BLD AUTO: 570 CELLS/UL (ref 200–950)
MONOCYTES NFR BLD AUTO: 9.5 %
NEUTROPHILS # BLD AUTO: 4026 CELLS/UL (ref 1500–7800)
NEUTROPHILS NFR BLD AUTO: 67.1 %
NITRITE UR QL STRIP: NEGATIVE
NONHDLC SERPL-MCNC: 161 MG/DL (CALC)
PH UR STRIP: 6.5 [PH] (ref 5–8)
PLATELET # BLD AUTO: 248 THOUSAND/UL (ref 140–400)
PMV BLD REES-ECKER: 10.3 FL (ref 7.5–12.5)
POTASSIUM SERPL-SCNC: 3.8 MMOL/L (ref 3.5–5.3)
PROT SERPL-MCNC: 7.2 G/DL (ref 6.1–8.1)
PROT UR QL STRIP: ABNORMAL
PSA SERPL-MCNC: 0.3 NG/ML
RBC # BLD AUTO: 5.23 MILLION/UL (ref 4.2–5.8)
RBC #/AREA URNS HPF: ABNORMAL /HPF
SODIUM SERPL-SCNC: 140 MMOL/L (ref 135–146)
SP GR UR STRIP: 1.02 (ref 1–1.03)
SQUAMOUS #/AREA URNS HPF: ABNORMAL /HPF
TRIGL SERPL-MCNC: 258 MG/DL
TSH SERPL-ACNC: 2.94 MIU/L (ref 0.4–4.5)
WBC # BLD AUTO: 6 THOUSAND/UL (ref 3.8–10.8)
WBC #/AREA URNS HPF: ABNORMAL /HPF

## 2021-03-10 ENCOUNTER — OFFICE VISIT (OUTPATIENT)
Dept: FAMILY MEDICINE | Facility: CLINIC | Age: 61
End: 2021-03-10
Payer: COMMERCIAL

## 2021-03-10 VITALS
HEIGHT: 73 IN | WEIGHT: 247 LBS | BODY MASS INDEX: 32.74 KG/M2 | DIASTOLIC BLOOD PRESSURE: 70 MMHG | SYSTOLIC BLOOD PRESSURE: 118 MMHG | HEART RATE: 60 BPM

## 2021-03-10 DIAGNOSIS — D68.51 FACTOR 5 LEIDEN MUTATION, HETEROZYGOUS: ICD-10-CM

## 2021-03-10 DIAGNOSIS — K29.70 GASTRITIS WITHOUT BLEEDING, UNSPECIFIED CHRONICITY, UNSPECIFIED GASTRITIS TYPE: ICD-10-CM

## 2021-03-10 DIAGNOSIS — B35.6 TINEA CRURIS: ICD-10-CM

## 2021-03-10 DIAGNOSIS — I10 ESSENTIAL HYPERTENSION: Primary | ICD-10-CM

## 2021-03-10 DIAGNOSIS — K21.9 GASTROESOPHAGEAL REFLUX DISEASE WITHOUT ESOPHAGITIS: ICD-10-CM

## 2021-03-10 DIAGNOSIS — F41.1 GENERALIZED ANXIETY DISORDER: ICD-10-CM

## 2021-03-10 DIAGNOSIS — E78.2 MIXED HYPERLIPIDEMIA: ICD-10-CM

## 2021-03-10 PROCEDURE — 3078F PR MOST RECENT DIASTOLIC BLOOD PRESSURE < 80 MM HG: ICD-10-PCS | Mod: S$GLB,,, | Performed by: FAMILY MEDICINE

## 2021-03-10 PROCEDURE — 3074F PR MOST RECENT SYSTOLIC BLOOD PRESSURE < 130 MM HG: ICD-10-PCS | Mod: S$GLB,,, | Performed by: FAMILY MEDICINE

## 2021-03-10 PROCEDURE — 3008F PR BODY MASS INDEX (BMI) DOCUMENTED: ICD-10-PCS | Mod: S$GLB,,, | Performed by: FAMILY MEDICINE

## 2021-03-10 PROCEDURE — 3008F BODY MASS INDEX DOCD: CPT | Mod: S$GLB,,, | Performed by: FAMILY MEDICINE

## 2021-03-10 PROCEDURE — 99214 PR OFFICE/OUTPT VISIT, EST, LEVL IV, 30-39 MIN: ICD-10-PCS | Mod: S$GLB,,, | Performed by: FAMILY MEDICINE

## 2021-03-10 PROCEDURE — 3078F DIAST BP <80 MM HG: CPT | Mod: S$GLB,,, | Performed by: FAMILY MEDICINE

## 2021-03-10 PROCEDURE — 99214 OFFICE O/P EST MOD 30 MIN: CPT | Mod: S$GLB,,, | Performed by: FAMILY MEDICINE

## 2021-03-10 PROCEDURE — 3074F SYST BP LT 130 MM HG: CPT | Mod: S$GLB,,, | Performed by: FAMILY MEDICINE

## 2021-03-10 RX ORDER — LORAZEPAM 0.5 MG/1
0.5 TABLET ORAL
Qty: 30 TABLET | Refills: 3 | Status: SHIPPED | OUTPATIENT
Start: 2021-03-10 | End: 2022-03-15 | Stop reason: SDUPTHER

## 2021-03-10 RX ORDER — LOSARTAN POTASSIUM 50 MG/1
50 TABLET ORAL DAILY
Qty: 90 TABLET | Refills: 1 | Status: SHIPPED | OUTPATIENT
Start: 2021-03-10 | End: 2021-09-13 | Stop reason: SDUPTHER

## 2021-03-10 RX ORDER — AMLODIPINE BESYLATE 10 MG/1
10 TABLET ORAL DAILY
Qty: 90 TABLET | Refills: 1 | Status: SHIPPED | OUTPATIENT
Start: 2021-03-10 | End: 2021-09-13 | Stop reason: SDUPTHER

## 2021-03-10 RX ORDER — HYDROCHLOROTHIAZIDE 25 MG/1
25 TABLET ORAL DAILY
Qty: 90 TABLET | Refills: 1 | Status: SHIPPED | OUTPATIENT
Start: 2021-03-10 | End: 2021-09-13 | Stop reason: SDUPTHER

## 2021-03-10 RX ORDER — CLOTRIMAZOLE AND BETAMETHASONE DIPROPIONATE 10; .64 MG/G; MG/G
CREAM TOPICAL 2 TIMES DAILY
Qty: 15 G | Refills: 2 | Status: SHIPPED | OUTPATIENT
Start: 2021-03-10 | End: 2021-09-13 | Stop reason: SDUPTHER

## 2021-03-10 RX ORDER — OMEPRAZOLE 40 MG/1
40 CAPSULE, DELAYED RELEASE ORAL DAILY
Qty: 90 CAPSULE | Refills: 1 | Status: SHIPPED | OUTPATIENT
Start: 2021-03-10 | End: 2022-03-15 | Stop reason: SDUPTHER

## 2021-03-10 RX ORDER — ATENOLOL 50 MG/1
50 TABLET ORAL 2 TIMES DAILY
Qty: 180 TABLET | Refills: 1 | Status: SHIPPED | OUTPATIENT
Start: 2021-03-10 | End: 2021-09-13 | Stop reason: SDUPTHER

## 2021-08-26 ENCOUNTER — TELEPHONE (OUTPATIENT)
Dept: FAMILY MEDICINE | Facility: CLINIC | Age: 61
End: 2021-08-26

## 2021-09-13 ENCOUNTER — OFFICE VISIT (OUTPATIENT)
Dept: FAMILY MEDICINE | Facility: CLINIC | Age: 61
End: 2021-09-13
Payer: COMMERCIAL

## 2021-09-13 VITALS
HEART RATE: 68 BPM | HEIGHT: 73 IN | WEIGHT: 238 LBS | DIASTOLIC BLOOD PRESSURE: 70 MMHG | SYSTOLIC BLOOD PRESSURE: 120 MMHG | BODY MASS INDEX: 31.54 KG/M2

## 2021-09-13 DIAGNOSIS — F41.1 GENERALIZED ANXIETY DISORDER: ICD-10-CM

## 2021-09-13 DIAGNOSIS — B35.6 TINEA CRURIS: ICD-10-CM

## 2021-09-13 DIAGNOSIS — E78.2 MIXED HYPERLIPIDEMIA: ICD-10-CM

## 2021-09-13 DIAGNOSIS — K21.9 GASTROESOPHAGEAL REFLUX DISEASE WITHOUT ESOPHAGITIS: ICD-10-CM

## 2021-09-13 DIAGNOSIS — D68.51 FACTOR 5 LEIDEN MUTATION, HETEROZYGOUS: ICD-10-CM

## 2021-09-13 DIAGNOSIS — K58.9 IRRITABLE BOWEL SYNDROME WITHOUT DIARRHEA: ICD-10-CM

## 2021-09-13 DIAGNOSIS — I10 ESSENTIAL HYPERTENSION: Primary | ICD-10-CM

## 2021-09-13 PROCEDURE — 3008F BODY MASS INDEX DOCD: CPT | Mod: S$GLB,,, | Performed by: FAMILY MEDICINE

## 2021-09-13 PROCEDURE — 99214 OFFICE O/P EST MOD 30 MIN: CPT | Mod: S$GLB,,, | Performed by: FAMILY MEDICINE

## 2021-09-13 PROCEDURE — 3078F PR MOST RECENT DIASTOLIC BLOOD PRESSURE < 80 MM HG: ICD-10-PCS | Mod: S$GLB,,, | Performed by: FAMILY MEDICINE

## 2021-09-13 PROCEDURE — 3074F PR MOST RECENT SYSTOLIC BLOOD PRESSURE < 130 MM HG: ICD-10-PCS | Mod: S$GLB,,, | Performed by: FAMILY MEDICINE

## 2021-09-13 PROCEDURE — 3074F SYST BP LT 130 MM HG: CPT | Mod: S$GLB,,, | Performed by: FAMILY MEDICINE

## 2021-09-13 PROCEDURE — 3078F DIAST BP <80 MM HG: CPT | Mod: S$GLB,,, | Performed by: FAMILY MEDICINE

## 2021-09-13 PROCEDURE — 3008F PR BODY MASS INDEX (BMI) DOCUMENTED: ICD-10-PCS | Mod: S$GLB,,, | Performed by: FAMILY MEDICINE

## 2021-09-13 PROCEDURE — 99214 PR OFFICE/OUTPT VISIT, EST, LEVL IV, 30-39 MIN: ICD-10-PCS | Mod: S$GLB,,, | Performed by: FAMILY MEDICINE

## 2021-09-13 PROCEDURE — 4010F PR ACE/ARB THEARPY RXD/TAKEN: ICD-10-PCS | Mod: S$GLB,,, | Performed by: FAMILY MEDICINE

## 2021-09-13 PROCEDURE — 4010F ACE/ARB THERAPY RXD/TAKEN: CPT | Mod: S$GLB,,, | Performed by: FAMILY MEDICINE

## 2021-09-13 RX ORDER — LANOLIN ALCOHOL/MO/W.PET/CERES
400 CREAM (GRAM) TOPICAL DAILY
COMMUNITY

## 2021-09-13 RX ORDER — CLOTRIMAZOLE AND BETAMETHASONE DIPROPIONATE 10; .64 MG/G; MG/G
CREAM TOPICAL 2 TIMES DAILY
Qty: 30 G | Refills: 2 | Status: SHIPPED | OUTPATIENT
Start: 2021-09-13 | End: 2022-12-13 | Stop reason: SDUPTHER

## 2021-09-13 RX ORDER — HYDROCHLOROTHIAZIDE 25 MG/1
25 TABLET ORAL DAILY
Qty: 90 TABLET | Refills: 1 | Status: SHIPPED | OUTPATIENT
Start: 2021-09-13 | End: 2022-03-15 | Stop reason: SDUPTHER

## 2021-09-13 RX ORDER — ATENOLOL 50 MG/1
50 TABLET ORAL 2 TIMES DAILY
Qty: 180 TABLET | Refills: 1 | Status: SHIPPED | OUTPATIENT
Start: 2021-09-13 | End: 2022-03-15 | Stop reason: SDUPTHER

## 2021-09-13 RX ORDER — AMLODIPINE BESYLATE 10 MG/1
10 TABLET ORAL DAILY
Qty: 90 TABLET | Refills: 1 | Status: SHIPPED | OUTPATIENT
Start: 2021-09-13 | End: 2022-03-15 | Stop reason: SDUPTHER

## 2021-09-13 RX ORDER — LOSARTAN POTASSIUM 50 MG/1
50 TABLET ORAL DAILY
Qty: 90 TABLET | Refills: 1 | Status: SHIPPED | OUTPATIENT
Start: 2021-09-13 | End: 2022-03-15 | Stop reason: SDUPTHER

## 2022-01-06 ENCOUNTER — TELEPHONE (OUTPATIENT)
Dept: FAMILY MEDICINE | Facility: CLINIC | Age: 62
End: 2022-01-06
Payer: COMMERCIAL

## 2022-01-06 NOTE — TELEPHONE ENCOUNTER
Spoke with pt and let him know per Dr. Norman as long as he is starting to feel better no new recommendations.

## 2022-01-06 NOTE — TELEPHONE ENCOUNTER
----- Message from Desiree Traore sent at 1/6/2022  4:04 PM CST -----  Pt calling for medical advice said he had a coughing attack 12/30/2021 woke up 1/1/2022 with fever and chills, headaches, cough said he self quarantined since said he's getting better taking vit c and mucinex but is not feeling his best wants to know what else can he do.  # [phone]

## 2022-03-03 ENCOUNTER — TELEPHONE (OUTPATIENT)
Dept: FAMILY MEDICINE | Facility: CLINIC | Age: 62
End: 2022-03-03
Payer: COMMERCIAL

## 2022-03-03 DIAGNOSIS — Z12.5 SPECIAL SCREENING FOR MALIGNANT NEOPLASM OF PROSTATE: ICD-10-CM

## 2022-03-03 DIAGNOSIS — Z00.00 ROUTINE GENERAL MEDICAL EXAMINATION AT A HEALTH CARE FACILITY: Primary | ICD-10-CM

## 2022-03-03 DIAGNOSIS — I10 ESSENTIAL HYPERTENSION: ICD-10-CM

## 2022-03-03 DIAGNOSIS — E78.2 MIXED HYPERLIPIDEMIA: ICD-10-CM

## 2022-03-03 DIAGNOSIS — Z79.899 ENCOUNTER FOR LONG-TERM (CURRENT) USE OF OTHER MEDICATIONS: ICD-10-CM

## 2022-03-03 NOTE — TELEPHONE ENCOUNTER
Spoke to patient that fasting lab and urine are due prior to visit after 3/4 as this is when last PSA was done. All yearly orders pended.

## 2022-03-12 LAB
ALBUMIN SERPL-MCNC: 4.6 G/DL (ref 3.6–5.1)
ALBUMIN/GLOB SERPL: 1.7 (CALC) (ref 1–2.5)
ALP SERPL-CCNC: 61 U/L (ref 35–144)
ALT SERPL-CCNC: 18 U/L (ref 9–46)
APPEARANCE UR: CLEAR
AST SERPL-CCNC: 19 U/L (ref 10–35)
BACTERIA #/AREA URNS HPF: NORMAL /HPF
BACTERIA UR CULT: NORMAL
BASOPHILS # BLD AUTO: 21 CELLS/UL (ref 0–200)
BASOPHILS NFR BLD AUTO: 0.4 %
BILIRUB SERPL-MCNC: 0.9 MG/DL (ref 0.2–1.2)
BILIRUB UR QL STRIP: NEGATIVE
BUN SERPL-MCNC: 23 MG/DL (ref 7–25)
BUN/CREAT SERPL: 17 (CALC) (ref 6–22)
CALCIUM SERPL-MCNC: 9.9 MG/DL (ref 8.6–10.3)
CHLORIDE SERPL-SCNC: 102 MMOL/L (ref 98–110)
CHOLEST SERPL-MCNC: 195 MG/DL
CHOLEST/HDLC SERPL: 4.6 (CALC)
CO2 SERPL-SCNC: 28 MMOL/L (ref 20–32)
COLOR UR: YELLOW
CREAT SERPL-MCNC: 1.38 MG/DL (ref 0.7–1.33)
EOSINOPHIL # BLD AUTO: 159 CELLS/UL (ref 15–500)
EOSINOPHIL NFR BLD AUTO: 3 %
ERYTHROCYTE [DISTWIDTH] IN BLOOD BY AUTOMATED COUNT: 13 % (ref 11–15)
GLOBULIN SER CALC-MCNC: 2.7 G/DL (CALC) (ref 1.9–3.7)
GLUCOSE SERPL-MCNC: 100 MG/DL (ref 65–99)
GLUCOSE UR QL STRIP: NEGATIVE
HCT VFR BLD AUTO: 43.9 % (ref 38.5–50)
HDLC SERPL-MCNC: 42 MG/DL
HGB BLD-MCNC: 14.8 G/DL (ref 13.2–17.1)
HGB UR QL STRIP: NEGATIVE
HYALINE CASTS #/AREA URNS LPF: NORMAL /LPF
KETONES UR QL STRIP: NEGATIVE
LDLC SERPL CALC-MCNC: 127 MG/DL (CALC)
LEUKOCYTE ESTERASE UR QL STRIP: NEGATIVE
LYMPHOCYTES # BLD AUTO: 1367 CELLS/UL (ref 850–3900)
LYMPHOCYTES NFR BLD AUTO: 25.8 %
MCH RBC QN AUTO: 30.1 PG (ref 27–33)
MCHC RBC AUTO-ENTMCNC: 33.7 G/DL (ref 32–36)
MCV RBC AUTO: 89.4 FL (ref 80–100)
MONOCYTES # BLD AUTO: 663 CELLS/UL (ref 200–950)
MONOCYTES NFR BLD AUTO: 12.5 %
NEUTROPHILS # BLD AUTO: 3090 CELLS/UL (ref 1500–7800)
NEUTROPHILS NFR BLD AUTO: 58.3 %
NITRITE UR QL STRIP: NEGATIVE
NONHDLC SERPL-MCNC: 153 MG/DL (CALC)
PH UR STRIP: 5.5 [PH] (ref 5–8)
PLATELET # BLD AUTO: 236 THOUSAND/UL (ref 140–400)
PMV BLD REES-ECKER: 10.3 FL (ref 7.5–12.5)
POTASSIUM SERPL-SCNC: 3.9 MMOL/L (ref 3.5–5.3)
PROT SERPL-MCNC: 7.3 G/DL (ref 6.1–8.1)
PROT UR QL STRIP: NEGATIVE
PSA SERPL-MCNC: 0.26 NG/ML
RBC # BLD AUTO: 4.91 MILLION/UL (ref 4.2–5.8)
RBC #/AREA URNS HPF: NORMAL /HPF
SODIUM SERPL-SCNC: 140 MMOL/L (ref 135–146)
SP GR UR STRIP: 1.01 (ref 1–1.03)
SQUAMOUS #/AREA URNS HPF: NORMAL /HPF
TRIGL SERPL-MCNC: 145 MG/DL
TSH SERPL-ACNC: 2.22 MIU/L (ref 0.4–4.5)
WBC # BLD AUTO: 5.3 THOUSAND/UL (ref 3.8–10.8)
WBC #/AREA URNS HPF: NORMAL /HPF

## 2022-03-15 ENCOUNTER — OFFICE VISIT (OUTPATIENT)
Dept: FAMILY MEDICINE | Facility: CLINIC | Age: 62
End: 2022-03-15
Payer: COMMERCIAL

## 2022-03-15 VITALS
WEIGHT: 243 LBS | HEART RATE: 72 BPM | DIASTOLIC BLOOD PRESSURE: 80 MMHG | BODY MASS INDEX: 32.2 KG/M2 | SYSTOLIC BLOOD PRESSURE: 128 MMHG | HEIGHT: 73 IN

## 2022-03-15 DIAGNOSIS — K29.70 GASTRITIS WITHOUT BLEEDING, UNSPECIFIED CHRONICITY, UNSPECIFIED GASTRITIS TYPE: ICD-10-CM

## 2022-03-15 DIAGNOSIS — Z00.00 WELLNESS EXAMINATION: Primary | ICD-10-CM

## 2022-03-15 DIAGNOSIS — L71.9 ROSACEA: ICD-10-CM

## 2022-03-15 DIAGNOSIS — D68.51 FACTOR 5 LEIDEN MUTATION, HETEROZYGOUS: ICD-10-CM

## 2022-03-15 DIAGNOSIS — E78.2 MIXED HYPERLIPIDEMIA: ICD-10-CM

## 2022-03-15 DIAGNOSIS — K58.9 IRRITABLE BOWEL SYNDROME WITHOUT DIARRHEA: ICD-10-CM

## 2022-03-15 DIAGNOSIS — I10 ESSENTIAL HYPERTENSION: ICD-10-CM

## 2022-03-15 DIAGNOSIS — F41.1 GENERALIZED ANXIETY DISORDER: ICD-10-CM

## 2022-03-15 DIAGNOSIS — K21.9 GASTROESOPHAGEAL REFLUX DISEASE WITHOUT ESOPHAGITIS: ICD-10-CM

## 2022-03-15 PROCEDURE — 3008F PR BODY MASS INDEX (BMI) DOCUMENTED: ICD-10-PCS | Mod: S$GLB,,, | Performed by: FAMILY MEDICINE

## 2022-03-15 PROCEDURE — 3074F SYST BP LT 130 MM HG: CPT | Mod: S$GLB,,, | Performed by: FAMILY MEDICINE

## 2022-03-15 PROCEDURE — 3008F BODY MASS INDEX DOCD: CPT | Mod: S$GLB,,, | Performed by: FAMILY MEDICINE

## 2022-03-15 PROCEDURE — 99396 PR PREVENTIVE VISIT,EST,40-64: ICD-10-PCS | Mod: S$GLB,,, | Performed by: FAMILY MEDICINE

## 2022-03-15 PROCEDURE — 3074F PR MOST RECENT SYSTOLIC BLOOD PRESSURE < 130 MM HG: ICD-10-PCS | Mod: S$GLB,,, | Performed by: FAMILY MEDICINE

## 2022-03-15 PROCEDURE — 99396 PREV VISIT EST AGE 40-64: CPT | Mod: S$GLB,,, | Performed by: FAMILY MEDICINE

## 2022-03-15 PROCEDURE — 3079F DIAST BP 80-89 MM HG: CPT | Mod: S$GLB,,, | Performed by: FAMILY MEDICINE

## 2022-03-15 PROCEDURE — 4010F PR ACE/ARB THEARPY RXD/TAKEN: ICD-10-PCS | Mod: S$GLB,,, | Performed by: FAMILY MEDICINE

## 2022-03-15 PROCEDURE — 3079F PR MOST RECENT DIASTOLIC BLOOD PRESSURE 80-89 MM HG: ICD-10-PCS | Mod: S$GLB,,, | Performed by: FAMILY MEDICINE

## 2022-03-15 PROCEDURE — 4010F ACE/ARB THERAPY RXD/TAKEN: CPT | Mod: S$GLB,,, | Performed by: FAMILY MEDICINE

## 2022-03-15 RX ORDER — LOSARTAN POTASSIUM 50 MG/1
50 TABLET ORAL DAILY
Qty: 90 TABLET | Refills: 1 | Status: SHIPPED | OUTPATIENT
Start: 2022-03-15 | End: 2022-09-19 | Stop reason: SDUPTHER

## 2022-03-15 RX ORDER — OMEPRAZOLE 40 MG/1
40 CAPSULE, DELAYED RELEASE ORAL DAILY
Qty: 90 CAPSULE | Refills: 1 | Status: SHIPPED | OUTPATIENT
Start: 2022-03-15 | End: 2022-09-19 | Stop reason: SDUPTHER

## 2022-03-15 RX ORDER — ATENOLOL 50 MG/1
50 TABLET ORAL 2 TIMES DAILY
Qty: 180 TABLET | Refills: 1 | Status: SHIPPED | OUTPATIENT
Start: 2022-03-15 | End: 2022-09-19 | Stop reason: SDUPTHER

## 2022-03-15 RX ORDER — DICYCLOMINE HYDROCHLORIDE 20 MG/1
20 TABLET ORAL EVERY 6 HOURS
Qty: 50 TABLET | Refills: 1 | Status: SHIPPED | OUTPATIENT
Start: 2022-03-15 | End: 2022-04-14

## 2022-03-15 RX ORDER — HYDROCHLOROTHIAZIDE 25 MG/1
25 TABLET ORAL DAILY
Qty: 90 TABLET | Refills: 1 | Status: SHIPPED | OUTPATIENT
Start: 2022-03-15 | End: 2022-09-19 | Stop reason: SDUPTHER

## 2022-03-15 RX ORDER — DOXYCYCLINE 100 MG/1
100 CAPSULE ORAL DAILY
Qty: 30 CAPSULE | Refills: 1 | Status: SHIPPED | OUTPATIENT
Start: 2022-03-15 | End: 2022-04-27

## 2022-03-15 RX ORDER — AMLODIPINE BESYLATE 10 MG/1
10 TABLET ORAL DAILY
Qty: 90 TABLET | Refills: 1 | Status: SHIPPED | OUTPATIENT
Start: 2022-03-15 | End: 2022-09-19 | Stop reason: SDUPTHER

## 2022-03-15 RX ORDER — LORAZEPAM 0.5 MG/1
0.5 TABLET ORAL
Qty: 30 TABLET | Refills: 3 | Status: SHIPPED | OUTPATIENT
Start: 2022-03-15 | End: 2022-09-19 | Stop reason: SDUPTHER

## 2022-03-17 ENCOUNTER — TELEPHONE (OUTPATIENT)
Dept: FAMILY MEDICINE | Facility: CLINIC | Age: 62
End: 2022-03-17
Payer: COMMERCIAL

## 2022-03-17 NOTE — TELEPHONE ENCOUNTER
----- Message from Didi Ravi sent at 3/17/2022  9:21 AM CDT -----  Vm- pt was seen on Tuesday and has another question. Please call   [phone]

## 2022-03-17 NOTE — TELEPHONE ENCOUNTER
"Spoke with pt and let him know per Dr. Norman "the fungal topical toenail treatments do not work! Bone spur only see foot doctor if pain develops in bone spur."   "

## 2022-04-27 RX ORDER — MINOCYCLINE HYDROCHLORIDE 100 MG/1
100 CAPSULE ORAL DAILY
Qty: 30 CAPSULE | Refills: 1 | Status: SHIPPED | OUTPATIENT
Start: 2022-04-27 | End: 2022-12-19

## 2022-04-27 NOTE — TELEPHONE ENCOUNTER
"Spoke with pt and let him know per Dr. Norman change to minocycline 100mg 1 po q day #30 1 refill."   "

## 2022-04-27 NOTE — TELEPHONE ENCOUNTER
----- Message from Desiree Eugenie sent at 4/27/2022  2:25 PM CDT -----  Pt calling said he was seen about one month ago and just finished the Doxy yesterday he needs to know if he should refill since he does not feel like it 100% helped.  # [phone]

## 2022-06-01 ENCOUNTER — TELEPHONE (OUTPATIENT)
Dept: FAMILY MEDICINE | Facility: CLINIC | Age: 62
End: 2022-06-01

## 2022-06-01 NOTE — TELEPHONE ENCOUNTER
----- Message from Desiree Traore sent at 6/1/2022 11:54 AM CDT -----  Pt calling said he has been on an abx for two months for ear and face flare up but he is still not completely better and is now having wrist pain as well but does not remember hitting it but is still and painful, thinks he needs to be seen.   # [phone]

## 2022-06-07 ENCOUNTER — OFFICE VISIT (OUTPATIENT)
Dept: FAMILY MEDICINE | Facility: CLINIC | Age: 62
End: 2022-06-07
Payer: COMMERCIAL

## 2022-06-07 VITALS
DIASTOLIC BLOOD PRESSURE: 86 MMHG | BODY MASS INDEX: 33.13 KG/M2 | HEIGHT: 73 IN | HEART RATE: 76 BPM | WEIGHT: 250 LBS | SYSTOLIC BLOOD PRESSURE: 136 MMHG

## 2022-06-07 DIAGNOSIS — E78.2 MIXED HYPERLIPIDEMIA: ICD-10-CM

## 2022-06-07 DIAGNOSIS — M77.9 TENDONITIS: Primary | ICD-10-CM

## 2022-06-07 DIAGNOSIS — I10 ESSENTIAL HYPERTENSION: ICD-10-CM

## 2022-06-07 DIAGNOSIS — L71.9 ROSACEA: ICD-10-CM

## 2022-06-07 PROCEDURE — 99213 OFFICE O/P EST LOW 20 MIN: CPT | Mod: S$GLB,,, | Performed by: FAMILY MEDICINE

## 2022-06-07 PROCEDURE — 4010F ACE/ARB THERAPY RXD/TAKEN: CPT | Mod: CPTII,S$GLB,, | Performed by: FAMILY MEDICINE

## 2022-06-07 PROCEDURE — 3079F PR MOST RECENT DIASTOLIC BLOOD PRESSURE 80-89 MM HG: ICD-10-PCS | Mod: CPTII,S$GLB,, | Performed by: FAMILY MEDICINE

## 2022-06-07 PROCEDURE — 3079F DIAST BP 80-89 MM HG: CPT | Mod: CPTII,S$GLB,, | Performed by: FAMILY MEDICINE

## 2022-06-07 PROCEDURE — 3075F SYST BP GE 130 - 139MM HG: CPT | Mod: CPTII,S$GLB,, | Performed by: FAMILY MEDICINE

## 2022-06-07 PROCEDURE — 99213 PR OFFICE/OUTPT VISIT, EST, LEVL III, 20-29 MIN: ICD-10-PCS | Mod: S$GLB,,, | Performed by: FAMILY MEDICINE

## 2022-06-07 PROCEDURE — 1159F PR MEDICATION LIST DOCUMENTED IN MEDICAL RECORD: ICD-10-PCS | Mod: CPTII,S$GLB,, | Performed by: FAMILY MEDICINE

## 2022-06-07 PROCEDURE — 3075F PR MOST RECENT SYSTOLIC BLOOD PRESS GE 130-139MM HG: ICD-10-PCS | Mod: CPTII,S$GLB,, | Performed by: FAMILY MEDICINE

## 2022-06-07 PROCEDURE — 1159F MED LIST DOCD IN RCRD: CPT | Mod: CPTII,S$GLB,, | Performed by: FAMILY MEDICINE

## 2022-06-07 PROCEDURE — 3008F PR BODY MASS INDEX (BMI) DOCUMENTED: ICD-10-PCS | Mod: CPTII,S$GLB,, | Performed by: FAMILY MEDICINE

## 2022-06-07 PROCEDURE — 4010F PR ACE/ARB THEARPY RXD/TAKEN: ICD-10-PCS | Mod: CPTII,S$GLB,, | Performed by: FAMILY MEDICINE

## 2022-06-07 PROCEDURE — 3008F BODY MASS INDEX DOCD: CPT | Mod: CPTII,S$GLB,, | Performed by: FAMILY MEDICINE

## 2022-06-07 RX ORDER — METHYLPREDNISOLONE 4 MG/1
TABLET ORAL
Qty: 1 EACH | Refills: 0 | Status: SHIPPED | OUTPATIENT
Start: 2022-06-07 | End: 2022-06-28

## 2022-06-08 NOTE — PROGRESS NOTES
SUBJECTIVE:    Patient ID: Otto Dawson is a 61 y.o. male.    Chief Complaint: Wrist Pain (Did not bring bottles//complains of  ongoing left wrist pain, has been wearing a brace and finished antibiotics//bs)    61-year-old male complains of left wrist and forearm pain.  This been going on for several weeks.  He took Aleve with minimal results.  He is wearing a wrist splint  intermittently with some reduction in pain.  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    Rosacea has tried minocycline and doxycycline, still gets a red rash to the face and nasal labial folds.    Left wrist ganglion cyst as decreased in size.    Blood pressure well controlled      Telephone on 03/03/2022   Component Date Value Ref Range Status    Glucose 03/11/2022 100 (A) 65 - 99 mg/dL Final    BUN 03/11/2022 23  7 - 25 mg/dL Final    Creatinine 03/11/2022 1.38 (A) 0.70 - 1.33 mg/dL Final    eGFR if non  03/11/2022 56 (A) > OR = 60 mL/min/1.73m2 Final    eGFR if  03/11/2022 64  > OR = 60 mL/min/1.73m2 Final    BUN/Creatinine Ratio 03/11/2022 17  6 - 22 (calc) Final    Sodium 03/11/2022 140  135 - 146 mmol/L Final    Potassium 03/11/2022 3.9  3.5 - 5.3 mmol/L Final    Chloride 03/11/2022 102  98 - 110 mmol/L Final    CO2 03/11/2022 28  20 - 32 mmol/L Final    Calcium 03/11/2022 9.9  8.6 - 10.3 mg/dL Final    Total Protein 03/11/2022 7.3  6.1 - 8.1 g/dL Final    Albumin 03/11/2022 4.6  3.6 - 5.1 g/dL Final    Globulin, Total 03/11/2022 2.7  1.9 - 3.7 g/dL (calc) Final    Albumin/Globulin Ratio 03/11/2022 1.7  1.0 - 2.5 (calc) Final    Total Bilirubin 03/11/2022 0.9  0.2 - 1.2 mg/dL Final    Alkaline Phosphatase 03/11/2022 61  35 - 144 U/L Final    AST 03/11/2022 19  10 - 35 U/L Final    ALT 03/11/2022 18  9 - 46 U/L Final    Cholesterol 03/11/2022 195  <200 mg/dL Final    HDL 03/11/2022 42  > OR = 40 mg/dL Final    Triglycerides 03/11/2022 145   <150 mg/dL Final    LDL Cholesterol 03/11/2022 127 (A) mg/dL (calc) Final    HDL/Cholesterol Ratio 03/11/2022 4.6  <5.0 (calc) Final    Non HDL Chol. (LDL+VLDL) 03/11/2022 153 (A) <130 mg/dL (calc) Final    Color, UA 03/11/2022 YELLOW  YELLOW Final    Appearance, UA 03/11/2022 CLEAR  CLEAR Final    Specific [place], UA 03/11/2022 1.014  1.001 - 1.035 Final    pH, UA 03/11/2022 5.5  5.0 - 8.0 Final    Glucose, UA 03/11/2022 NEGATIVE  NEGATIVE Final    Bilirubin, UA 03/11/2022 NEGATIVE  NEGATIVE Final    Ketones, UA 03/11/2022 NEGATIVE  NEGATIVE Final    Occult Blood UA 03/11/2022 NEGATIVE  NEGATIVE Final    Protein, UA 03/11/2022 NEGATIVE  NEGATIVE Final    Nitrite, UA 03/11/2022 NEGATIVE  NEGATIVE Final    Leukocytes, UA 03/11/2022 NEGATIVE  NEGATIVE Final    WBC Casts, UA 03/11/2022 NONE SEEN  < OR = 5 /HPF Final    RBC Casts, UA 03/11/2022 NONE SEEN  < OR = 2 /HPF Final    Squam Epithel, UA 03/11/2022 NONE SEEN  < OR = 5 /HPF Final    Bacteria, UA 03/11/2022 NONE SEEN  NONE SEEN /HPF Final    Hyaline Casts, UA 03/11/2022 NONE SEEN  NONE SEEN /LPF Final    Reflexive Urine Culture 03/11/2022    Final    TSH w/reflex to FT4 03/11/2022 2.22  0.40 - 4.50 mIU/L Final    PROSTATE SPECIFIC ANTIGEN, SCR - Q* 03/11/2022 0.26  < OR = 4.00 ng/mL Final    WBC 03/11/2022 5.3  3.8 - 10.8 Thousand/uL Final    RBC 03/11/2022 4.91  4.20 - 5.80 Million/uL Final    Hemoglobin 03/11/2022 14.8  13.2 - 17.1 g/dL Final    Hematocrit 03/11/2022 43.9  38.5 - 50.0 % Final    MCV 03/11/2022 89.4  80.0 - 100.0 fL Final    MCH 03/11/2022 30.1  27.0 - 33.0 pg Final    MCHC 03/11/2022 33.7  32.0 - 36.0 g/dL Final    RDW 03/11/2022 13.0  11.0 - 15.0 % Final    Platelets 03/11/2022 236  140 - 400 Thousand/uL Final    MPV 03/11/2022 10.3  7.5 - 12.5 fL Final    Neutrophils, Abs 03/11/2022 3,090  1,500 - 7,800 cells/uL Final    Lymph # 03/11/2022 1,367  850 - 3,900 cells/uL Final    Mono # 03/11/2022 663  200 - 950  cells/uL Final    Eos # 2022 159  15 - 500 cells/uL Final    Baso # 2022 21  0 - 200 cells/uL Final    Neutrophils Relative 2022 58.3  % Final    Lymph % 2022 25.8  % Final    Mono % 2022 12.5  % Final    Eosinophil % 2022 3.0  % Final    Basophil % 2022 0.4  % Final       Past Medical History:   Diagnosis Date    Anxiety     Arthritis     Depression     Factor 5 Leiden mutation, heterozygous     Gastritis 2019    Generalized anxiety disorder     History of nuclear stress test 2014    Hyperlipidemia     Hypertension      Social History     Socioeconomic History    Marital status: Single   Tobacco Use    Smoking status: Former Smoker     Types: Cigarettes     Quit date:      Years since quittin.4    Smokeless tobacco: Never Used   Substance and Sexual Activity    Alcohol use: Yes    Drug use: Never     Past Surgical History:   Procedure Laterality Date    COLONOSCOPY      Dr Don-rtc 5 yrs     HERNIA REPAIR      SINUS SURGERY  2018    nasal septoplasty/balloon sinuplasty- Dr Roman     Family History   Problem Relation Age of Onset    Heart disease Father        Review of patient's allergies indicates:   Allergen Reactions    Cefuroxime axetil     Ciprofloxacin Other (See Comments)    [place] Itching    Sulfacetamide sodium-sulfur Other (See Comments)    Tetracycline hcl Other (See Comments)       Current Outpatient Medications:     amLODIPine (NORVASC) 10 MG tablet, Take 1 tablet (10 mg total) by mouth once daily., Disp: 90 tablet, Rfl: 1    ascorbic acid, vitamin C, (VITAMIN C) 1000 MG tablet, 1 tablet once daily., Disp: , Rfl:     atenoloL (TENORMIN) 50 MG tablet, Take 1 tablet (50 mg total) by mouth 2 (two) times daily., Disp: 180 tablet, Rfl: 1    CALCIUM-MAGNESIUM ORAL, 1 tablet Daily., Disp: , Rfl:     cinnamon bark 500 mg capsule, 1 tablet Daily., Disp: , Rfl:     clotrimazole-betamethasone 1-0.05% (LOTRISONE)  cream, Apply topically 2 (two) times daily., Disp: 30 g, Rfl: 2    co-enzyme Q-10 30 mg capsule, Take 30 mg by mouth 3 (three) times daily., Disp: , Rfl:     cranberry 400 mg Cap, 1 capsule once daily., Disp: , Rfl:     garlic 1,000 mg Cap, 1 capsule once daily., Disp: , Rfl:     glucosam reed dip-chondroit-C-Mn 868-814-09-3 mg Cap, 1 tablet once daily., Disp: , Rfl:     hydroCHLOROthiazide (HYDRODIURIL) 25 MG tablet, Take 1 tablet (25 mg total) by mouth once daily., Disp: 90 tablet, Rfl: 1    hydrocortisone 2.5 % cream, Apply topically once daily. (Patient not taking: No sig reported), Disp: 15 g, Rfl: 2    LORazepam (ATIVAN) 0.5 MG tablet, Take 1 tablet (0.5 mg total) by mouth as needed., Disp: 30 tablet, Rfl: 3    losartan (COZAAR) 50 MG tablet, Take 1 tablet (50 mg total) by mouth once daily., Disp: 90 tablet, Rfl: 1    magnesium oxide (MAG-OX) 400 mg (241.3 mg magnesium) tablet, Take 400 mg by mouth once daily., Disp: , Rfl:     methylPREDNISolone (MEDROL DOSEPACK) 4 mg tablet, use as directed, Disp: 1 each, Rfl: 0    minocycline (MINOCIN,DYNACIN) 100 MG capsule, Take 1 capsule (100 mg total) by mouth once daily. (Patient not taking: Reported on 6/7/2022), Disp: 30 capsule, Rfl: 1    mv-mins-folic-lycopene-ginkgo 400-300-120 mcg-mcg-mg Tab, 1 tablet once daily., Disp: , Rfl:     omeprazole (PRILOSEC) 40 MG capsule, Take 1 capsule (40 mg total) by mouth once daily., Disp: 90 capsule, Rfl: 1    PHYTOSTEROL ORAL, 1 tablet once daily., Disp: , Rfl:     turmeric, bulk, 100 % Powd, 1 tablet once daily., Disp: , Rfl:     vitamin B comp with C no.4 150 mg Tab, 1 tablet once daily., Disp: , Rfl:     vitamin D (VITAMIN D3) 1000 units Tab, Take 1,000 Units by mouth once daily., Disp: , Rfl:     Review of Systems   Musculoskeletal: Positive for arthralgias (Left wrist pain and tenderness).          Objective:      Vitals:    06/07/22 1537 06/07/22 1552   BP: (!) 144/90 136/86   Pulse: 76    Weight: 113.4 kg  "(250 lb)    Height: 6' 1" (1.854 m)      Physical Exam  Vitals and nursing note reviewed.   Constitutional:       Appearance: He is well-developed.   HENT:      Head: Normocephalic and atraumatic.      Right Ear: External ear normal.      Left Ear: External ear normal.      Nose: Nose normal.   Eyes:      Pupils: Pupils are equal, round, and reactive to light.   Neck:      Thyroid: No thyromegaly.      Vascular: No carotid bruit.   Cardiovascular:      Rate and Rhythm: Normal rate and regular rhythm.      Heart sounds: Normal heart sounds. No murmur heard.  Pulmonary:      Effort: Pulmonary effort is normal.      Breath sounds: Normal breath sounds. No wheezing or rales.   Abdominal:      General: Bowel sounds are normal. There is no distension.      Palpations: Abdomen is soft.      Tenderness: There is no abdominal tenderness.   Musculoskeletal:         General: No tenderness or deformity. Normal range of motion.      Cervical back: Normal range of motion and neck supple.      Lumbar back: Normal. No spasms.      Comments: Bends 90 degrees at  waist, tender to palpation to the thumb extensor tendons.  Forearm also tender.  Good flexion extension of the wrist.  Small ganglion cyst is palpable   Lymphadenopathy:      Cervical: No cervical adenopathy.   Skin:     General: Skin is warm and dry.      Findings: No rash.   Neurological:      Mental Status: He is alert and oriented to person, place, and time. Mental status is at baseline.      Cranial Nerves: No cranial nerve deficit.      Coordination: Coordination normal.   Psychiatric:         Behavior: Behavior normal.         Thought Content: Thought content normal.         Judgment: Judgment normal.           Assessment:       1. Tendonitis    2. Rosacea    3. Essential hypertension    4. Mixed hyperlipidemia         Plan:       Tendonitis  -     methylPREDNISolone (MEDROL DOSEPACK) 4 mg tablet; use as directed  Dispense: 1 each; Refill: 0  Has  tendonitis of the " wrist and forearm.  Will try Medrol Dosepak.  Wrist splint to be used as needed.  If no response, may refer to orthopedics for cortisone injection  Rosacea  Continue minocycline.  Consider mild steroid lotion such as desowen  Essential hypertension  Blood pressure well controlled  Mixed hyperlipidemia      No follow-ups on file.        6/8/2022 Thompson Norman

## 2022-09-19 ENCOUNTER — OFFICE VISIT (OUTPATIENT)
Dept: FAMILY MEDICINE | Facility: CLINIC | Age: 62
End: 2022-09-19
Payer: COMMERCIAL

## 2022-09-19 VITALS
WEIGHT: 248 LBS | HEIGHT: 73 IN | BODY MASS INDEX: 32.87 KG/M2 | SYSTOLIC BLOOD PRESSURE: 138 MMHG | DIASTOLIC BLOOD PRESSURE: 80 MMHG | HEART RATE: 70 BPM

## 2022-09-19 DIAGNOSIS — H69.92 DYSFUNCTION OF LEFT EUSTACHIAN TUBE: ICD-10-CM

## 2022-09-19 DIAGNOSIS — L71.9 ROSACEA: ICD-10-CM

## 2022-09-19 DIAGNOSIS — I10 ESSENTIAL HYPERTENSION: Primary | ICD-10-CM

## 2022-09-19 DIAGNOSIS — F41.1 GENERALIZED ANXIETY DISORDER: ICD-10-CM

## 2022-09-19 DIAGNOSIS — D68.51 FACTOR 5 LEIDEN MUTATION, HETEROZYGOUS: ICD-10-CM

## 2022-09-19 DIAGNOSIS — E78.2 MIXED HYPERLIPIDEMIA: ICD-10-CM

## 2022-09-19 DIAGNOSIS — B35.1 ONYCHOMYCOSIS: ICD-10-CM

## 2022-09-19 DIAGNOSIS — Z12.5 SPECIAL SCREENING FOR MALIGNANT NEOPLASM OF PROSTATE: ICD-10-CM

## 2022-09-19 DIAGNOSIS — K29.70 GASTRITIS WITHOUT BLEEDING, UNSPECIFIED CHRONICITY, UNSPECIFIED GASTRITIS TYPE: ICD-10-CM

## 2022-09-19 DIAGNOSIS — K21.9 GASTROESOPHAGEAL REFLUX DISEASE WITHOUT ESOPHAGITIS: ICD-10-CM

## 2022-09-19 PROCEDURE — 3079F PR MOST RECENT DIASTOLIC BLOOD PRESSURE 80-89 MM HG: ICD-10-PCS | Mod: CPTII,S$GLB,, | Performed by: FAMILY MEDICINE

## 2022-09-19 PROCEDURE — 4010F PR ACE/ARB THEARPY RXD/TAKEN: ICD-10-PCS | Mod: CPTII,S$GLB,, | Performed by: FAMILY MEDICINE

## 2022-09-19 PROCEDURE — 3008F PR BODY MASS INDEX (BMI) DOCUMENTED: ICD-10-PCS | Mod: CPTII,S$GLB,, | Performed by: FAMILY MEDICINE

## 2022-09-19 PROCEDURE — 3079F DIAST BP 80-89 MM HG: CPT | Mod: CPTII,S$GLB,, | Performed by: FAMILY MEDICINE

## 2022-09-19 PROCEDURE — 3008F BODY MASS INDEX DOCD: CPT | Mod: CPTII,S$GLB,, | Performed by: FAMILY MEDICINE

## 2022-09-19 PROCEDURE — 99214 PR OFFICE/OUTPT VISIT, EST, LEVL IV, 30-39 MIN: ICD-10-PCS | Mod: S$GLB,,, | Performed by: FAMILY MEDICINE

## 2022-09-19 PROCEDURE — 1159F MED LIST DOCD IN RCRD: CPT | Mod: CPTII,S$GLB,, | Performed by: FAMILY MEDICINE

## 2022-09-19 PROCEDURE — 3075F SYST BP GE 130 - 139MM HG: CPT | Mod: CPTII,S$GLB,, | Performed by: FAMILY MEDICINE

## 2022-09-19 PROCEDURE — 99214 OFFICE O/P EST MOD 30 MIN: CPT | Mod: S$GLB,,, | Performed by: FAMILY MEDICINE

## 2022-09-19 PROCEDURE — 1159F PR MEDICATION LIST DOCUMENTED IN MEDICAL RECORD: ICD-10-PCS | Mod: CPTII,S$GLB,, | Performed by: FAMILY MEDICINE

## 2022-09-19 PROCEDURE — 3075F PR MOST RECENT SYSTOLIC BLOOD PRESS GE 130-139MM HG: ICD-10-PCS | Mod: CPTII,S$GLB,, | Performed by: FAMILY MEDICINE

## 2022-09-19 PROCEDURE — 4010F ACE/ARB THERAPY RXD/TAKEN: CPT | Mod: CPTII,S$GLB,, | Performed by: FAMILY MEDICINE

## 2022-09-19 RX ORDER — LOSARTAN POTASSIUM 50 MG/1
50 TABLET ORAL DAILY
Qty: 90 TABLET | Refills: 1 | Status: SHIPPED | OUTPATIENT
Start: 2022-09-19 | End: 2023-03-20 | Stop reason: SDUPTHER

## 2022-09-19 RX ORDER — HYDROCHLOROTHIAZIDE 25 MG/1
25 TABLET ORAL DAILY
Qty: 90 TABLET | Refills: 1 | Status: SHIPPED | OUTPATIENT
Start: 2022-09-19 | End: 2023-03-20 | Stop reason: SDUPTHER

## 2022-09-19 RX ORDER — AMLODIPINE BESYLATE 10 MG/1
10 TABLET ORAL DAILY
Qty: 90 TABLET | Refills: 1 | Status: SHIPPED | OUTPATIENT
Start: 2022-09-19 | End: 2023-03-20 | Stop reason: SDUPTHER

## 2022-09-19 RX ORDER — OMEPRAZOLE 40 MG/1
40 CAPSULE, DELAYED RELEASE ORAL DAILY
Qty: 90 CAPSULE | Refills: 1 | Status: SHIPPED | OUTPATIENT
Start: 2022-09-19 | End: 2022-12-19

## 2022-09-19 RX ORDER — LORAZEPAM 0.5 MG/1
0.5 TABLET ORAL
Qty: 30 TABLET | Refills: 3 | Status: SHIPPED | OUTPATIENT
Start: 2022-09-19 | End: 2023-03-20 | Stop reason: SDUPTHER

## 2022-09-19 RX ORDER — ATENOLOL 50 MG/1
50 TABLET ORAL 2 TIMES DAILY
Qty: 180 TABLET | Refills: 1 | Status: SHIPPED | OUTPATIENT
Start: 2022-09-19 | End: 2023-03-20 | Stop reason: SDUPTHER

## 2022-09-19 NOTE — PROGRESS NOTES
SUBJECTIVE:    Patient ID: Otto Dawson is a 61 y.o. male.    Chief Complaint: Hypertension (went over medication verbally, discuss about some episode dizziness, and shingles vaccine , abc   )    61-year-old male doing well except for some mild vertigo.  He saw ENT  in [place].  He had a crystal evaluation and was found to have eustachian tube dysfunction.  Treated with Flonase 2 squirts each nostril nightly for the next 2 months.  Patient admits to year-round allergies.    Positive for COVID in January of 2022.  He has not had any COVID booster yet.    Blood pressure well controlled    Rosacea-using a moisturizing cream, minocycline was not that effective.    Factor 5 Leiden mutation- no history of blood clots      No visits with results within 6 Month(s) from this visit.   Latest known visit with results is:   Telephone on 03/03/2022   Component Date Value Ref Range Status    Glucose 03/11/2022 100 (H)  65 - 99 mg/dL Final    BUN 03/11/2022 23  7 - 25 mg/dL Final    Creatinine 03/11/2022 1.38 (H)  0.70 - 1.33 mg/dL Final    eGFR if non  03/11/2022 56 (L)  > OR = 60 mL/min/1.73m2 Final    eGFR if  03/11/2022 64  > OR = 60 mL/min/1.73m2 Final    BUN/Creatinine Ratio 03/11/2022 17  6 - 22 (calc) Final    Sodium 03/11/2022 140  135 - 146 mmol/L Final    Potassium 03/11/2022 3.9  3.5 - 5.3 mmol/L Final    Chloride 03/11/2022 102  98 - 110 mmol/L Final    CO2 03/11/2022 28  20 - 32 mmol/L Final    Calcium 03/11/2022 9.9  8.6 - 10.3 mg/dL Final    Total Protein 03/11/2022 7.3  6.1 - 8.1 g/dL Final    Albumin 03/11/2022 4.6  3.6 - 5.1 g/dL Final    Globulin, Total 03/11/2022 2.7  1.9 - 3.7 g/dL (calc) Final    Albumin/Globulin Ratio 03/11/2022 1.7  1.0 - 2.5 (calc) Final    Total Bilirubin 03/11/2022 0.9  0.2 - 1.2 mg/dL Final    Alkaline Phosphatase 03/11/2022 61  35 - 144 U/L Final    AST 03/11/2022 19  10 - 35 U/L Final    ALT 03/11/2022 18  9 - 46 U/L Final     Cholesterol 03/11/2022 195  <200 mg/dL Final    HDL 03/11/2022 42  > OR = 40 mg/dL Final    Triglycerides 03/11/2022 145  <150 mg/dL Final    LDL Cholesterol 03/11/2022 127 (H)  mg/dL (calc) Final    HDL/Cholesterol Ratio 03/11/2022 4.6  <5.0 (calc) Final    Non HDL Chol. (LDL+VLDL) 03/11/2022 153 (H)  <130 mg/dL (calc) Final    Color, UA 03/11/2022 YELLOW  YELLOW Final    Appearance, UA 03/11/2022 CLEAR  CLEAR Final    Specific Gravity, UA 03/11/2022 1.014  1.001 - 1.035 Final    pH, UA 03/11/2022 5.5  5.0 - 8.0 Final    Glucose, UA 03/11/2022 NEGATIVE  NEGATIVE Final    Bilirubin, UA 03/11/2022 NEGATIVE  NEGATIVE Final    Ketones, UA 03/11/2022 NEGATIVE  NEGATIVE Final    Occult Blood UA 03/11/2022 NEGATIVE  NEGATIVE Final    Protein, UA 03/11/2022 NEGATIVE  NEGATIVE Final    Nitrite, UA 03/11/2022 NEGATIVE  NEGATIVE Final    Leukocytes, UA 03/11/2022 NEGATIVE  NEGATIVE Final    WBC Casts, UA 03/11/2022 NONE SEEN  < OR = 5 /HPF Final    RBC Casts, UA 03/11/2022 NONE SEEN  < OR = 2 /HPF Final    Squam Epithel, UA 03/11/2022 NONE SEEN  < OR = 5 /HPF Final    Bacteria, UA 03/11/2022 NONE SEEN  NONE SEEN /HPF Final    Hyaline Casts, UA 03/11/2022 NONE SEEN  NONE SEEN /LPF Final    Reflexive Urine Culture 03/11/2022    Final    TSH w/reflex to FT4 03/11/2022 2.22  0.40 - 4.50 mIU/L Final    PROSTATE SPECIFIC ANTIGEN, SCR - Q* 03/11/2022 0.26  < OR = 4.00 ng/mL Final    WBC 03/11/2022 5.3  3.8 - 10.8 Thousand/uL Final    RBC 03/11/2022 4.91  4.20 - 5.80 Million/uL Final    Hemoglobin 03/11/2022 14.8  13.2 - 17.1 g/dL Final    Hematocrit 03/11/2022 43.9  38.5 - 50.0 % Final    MCV 03/11/2022 89.4  80.0 - 100.0 fL Final    MCH 03/11/2022 30.1  27.0 - 33.0 pg Final    MCHC 03/11/2022 33.7  32.0 - 36.0 g/dL Final    RDW 03/11/2022 13.0  11.0 - 15.0 % Final    Platelets 03/11/2022 236  140 - 400 Thousand/uL Final    MPV 03/11/2022 10.3  7.5 - 12.5 fL Final    Neutrophils, Abs 03/11/2022 3,090  1,500 - 7,800 cells/uL Final     Lymph # 2022 1,367  850 - 3,900 cells/uL Final    Mono # 2022 663  200 - 950 cells/uL Final    Eos # 2022 159  15 - 500 cells/uL Final    Baso # 2022 21  0 - 200 cells/uL Final    Neutrophils Relative 2022 58.3  % Final    Lymph % 2022 25.8  % Final    Mono % 2022 12.5  % Final    Eosinophil % 2022 3.0  % Final    Basophil % 2022 0.4  % Final       Past Medical History:   Diagnosis Date    Anxiety     Arthritis     Depression     Factor 5 Leiden mutation, heterozygous     Gastritis 2019    Generalized anxiety disorder     History of nuclear stress test 2014    Hyperlipidemia     Hypertension      Social History     Socioeconomic History    Marital status: Single   Tobacco Use    Smoking status: Former     Types: Cigarettes     Quit date:      Years since quittin.7    Smokeless tobacco: Never   Substance and Sexual Activity    Alcohol use: Yes    Drug use: Never     Past Surgical History:   Procedure Laterality Date    COLONOSCOPY      Dr Don-rtc 5 yrs     HERNIA REPAIR      SINUS SURGERY  2018    nasal septoplasty/balloon sinuplasty- Dr Roman     Family History   Problem Relation Age of Onset    Heart disease Father        Review of patient's allergies indicates:   Allergen Reactions    Cefuroxime axetil     Ciprofloxacin Other (See Comments)    [place] Itching    Sulfacetamide sodium-sulfur Other (See Comments)    Tetracycline hcl Other (See Comments)       Current Outpatient Medications:     ascorbic acid, vitamin C, (VITAMIN C) 1000 MG tablet, 1 tablet once daily., Disp: , Rfl:     CALCIUM-MAGNESIUM ORAL, 1 tablet Daily., Disp: , Rfl:     cinnamon bark 500 mg capsule, 1 tablet Daily., Disp: , Rfl:     clotrimazole-betamethasone 1-0.05% (LOTRISONE) cream, Apply topically 2 (two) times daily., Disp: 30 g, Rfl: 2    co-enzyme Q-10 30 mg capsule, Take 30 mg by mouth 3 (three) times daily., Disp: , Rfl:     cranberry 400 mg Cap, 1 capsule once  daily., Disp: , Rfl:     garlic 1,000 mg Cap, 1 capsule once daily., Disp: , Rfl:     glucosam reed dip-chondroit-C-Mn 040-531-21-3 mg Cap, 1 tablet once daily., Disp: , Rfl:     hydrocortisone 2.5 % cream, Apply topically once daily., Disp: 15 g, Rfl: 2    magnesium oxide (MAG-OX) 400 mg (241.3 mg magnesium) tablet, Take 400 mg by mouth once daily., Disp: , Rfl:     mv-mins-folic-lycopene-ginkgo 400-300-120 mcg-mcg-mg Tab, 1 tablet once daily., Disp: , Rfl:     PHYTOSTEROL ORAL, 1 tablet once daily., Disp: , Rfl:     turmeric, bulk, 100 % Powd, 1 tablet once daily., Disp: , Rfl:     vitamin B comp with C no.4 150 mg Tab, 1 tablet once daily., Disp: , Rfl:     vitamin D (VITAMIN D3) 1000 units Tab, Take 1,000 Units by mouth once daily., Disp: , Rfl:     amLODIPine (NORVASC) 10 MG tablet, Take 1 tablet (10 mg total) by mouth once daily., Disp: 90 tablet, Rfl: 1    atenoloL (TENORMIN) 50 MG tablet, Take 1 tablet (50 mg total) by mouth 2 (two) times daily., Disp: 180 tablet, Rfl: 1    hydroCHLOROthiazide (HYDRODIURIL) 25 MG tablet, Take 1 tablet (25 mg total) by mouth once daily., Disp: 90 tablet, Rfl: 1    LORazepam (ATIVAN) 0.5 MG tablet, Take 1 tablet (0.5 mg total) by mouth as needed., Disp: 30 tablet, Rfl: 3    losartan (COZAAR) 50 MG tablet, Take 1 tablet (50 mg total) by mouth once daily., Disp: 90 tablet, Rfl: 1    minocycline (MINOCIN,DYNACIN) 100 MG capsule, Take 1 capsule (100 mg total) by mouth once daily. (Patient not taking: No sig reported), Disp: 30 capsule, Rfl: 1    omeprazole (PRILOSEC) 40 MG capsule, Take 1 capsule (40 mg total) by mouth once daily., Disp: 90 capsule, Rfl: 1    Review of Systems   Constitutional:  Negative for appetite change, chills, fatigue, fever and unexpected weight change.   HENT:  Negative for congestion, ear pain and trouble swallowing.    Eyes:  Negative for pain, discharge and visual disturbance.   Respiratory:  Negative for apnea, cough, shortness of breath and wheezing.   "  Cardiovascular:  Negative for chest pain and leg swelling.   Gastrointestinal:  Negative for abdominal pain, blood in stool, constipation, diarrhea, nausea and vomiting.   Endocrine: Negative for cold intolerance, heat intolerance and polydipsia.   Genitourinary:  Negative for dysuria, hematuria, testicular pain and urgency.   Musculoskeletal:  Negative for gait problem, joint swelling and myalgias.   Skin:         Rosacea to face   Neurological:  Negative for dizziness, seizures and numbness.   Psychiatric/Behavioral:  Negative for agitation, behavioral problems and hallucinations. The patient is not nervous/anxious.         Objective:      Vitals:    09/19/22 1026   BP: 138/80   Pulse: 70   Weight: 112.5 kg (248 lb)   Height: 6' 1" (1.854 m)     Physical Exam  Vitals and nursing note reviewed.   Constitutional:       General: He is not in acute distress.     Appearance: He is well-developed. He is obese. He is not toxic-appearing.   HENT:      Head: Normocephalic and atraumatic.      Right Ear: Tympanic membrane and external ear normal.      Left Ear: Tympanic membrane and external ear normal.      Nose: Nose normal.      Mouth/Throat:      Pharynx: Oropharynx is clear.   Eyes:      Pupils: Pupils are equal, round, and reactive to light.   Neck:      Thyroid: No thyromegaly.      Vascular: No carotid bruit.   Cardiovascular:      Rate and Rhythm: Normal rate and regular rhythm.      Heart sounds: Normal heart sounds. No murmur heard.  Pulmonary:      Effort: Pulmonary effort is normal.      Breath sounds: Normal breath sounds. No wheezing or rales.   Abdominal:      General: Bowel sounds are normal. There is no distension.      Palpations: Abdomen is soft.      Tenderness: There is no abdominal tenderness.   Musculoskeletal:         General: No tenderness or deformity. Normal range of motion.      Cervical back: Normal range of motion and neck supple.      Lumbar back: Normal. No spasms.      Comments: Bends 90 " degrees at  waist, good range of motion shoulders and knees, no pitting edema to lower extremities   Lymphadenopathy:      Cervical: No cervical adenopathy.   Skin:     General: Skin is warm and dry.      Findings: No rash.      Comments: Mild rosacea seen around the nose and the beard area   Neurological:      Mental Status: He is alert and oriented to person, place, and time. Mental status is at baseline.      Cranial Nerves: No cranial nerve deficit.      Coordination: Coordination normal.   Psychiatric:         Mood and Affect: Mood normal.         Behavior: Behavior normal.         Thought Content: Thought content normal.         Judgment: Judgment normal.         Assessment:       1. Essential hypertension    2. Gastritis without bleeding, unspecified chronicity, unspecified gastritis type    3. Generalized anxiety disorder    4. Onychomycosis    5. Rosacea    6. Mixed hyperlipidemia    7. Factor 5 Leiden mutation, heterozygous    8. Gastroesophageal reflux disease without esophagitis    9. Dysfunction of left eustachian tube           Plan:       Essential hypertension  Comments:  Well managed. requests refills. Plans to get labs done in 6 months  Orders:  -     amLODIPine (NORVASC) 10 MG tablet; Take 1 tablet (10 mg total) by mouth once daily.  Dispense: 90 tablet; Refill: 1  -     atenoloL (TENORMIN) 50 MG tablet; Take 1 tablet (50 mg total) by mouth 2 (two) times daily.  Dispense: 180 tablet; Refill: 1  -     hydroCHLOROthiazide (HYDRODIURIL) 25 MG tablet; Take 1 tablet (25 mg total) by mouth once daily.  Dispense: 90 tablet; Refill: 1  -     losartan (COZAAR) 50 MG tablet; Take 1 tablet (50 mg total) by mouth once daily.  Dispense: 90 tablet; Refill: 1    Gastritis without bleeding, unspecified chronicity, unspecified gastritis type  -     omeprazole (PRILOSEC) 40 MG capsule; Take 1 capsule (40 mg total) by mouth once daily.  Dispense: 90 capsule; Refill: 1  Continue omeprazole  Generalized anxiety  disorder  -     LORazepam (ATIVAN) 0.5 MG tablet; Take 1 tablet (0.5 mg total) by mouth as needed.  Dispense: 30 tablet; Refill: 3  Uses lorazepam only p.r.n.  Onychomycosis  OTC anti fungals being used to toenails  Rosacea  Continue moisturizing creams as is  Mixed hyperlipidemia  Lipids were under control, repeat labs in 6 months  Factor 5 Leiden mutation, heterozygous    Gastroesophageal reflux disease without esophagitis    Dysfunction of left eustachian tube  Continue Flonase nightly  No follow-ups on file.        9/19/2022 Thompson Norman

## 2022-09-26 ENCOUNTER — TELEPHONE (OUTPATIENT)
Dept: FAMILY MEDICINE | Facility: CLINIC | Age: 62
End: 2022-09-26

## 2022-09-26 NOTE — TELEPHONE ENCOUNTER
Spoke with pt and let him know the tetanus was last done in 2015 - he does not think he needs to be seen for this.

## 2022-09-26 NOTE — TELEPHONE ENCOUNTER
----- Message from Kathie Wagner MA sent at 9/26/2022 11:14 AM CDT -----  Pt calling to find out when his last tetanus short was because he cut himself with a avi nail on Saturday. CB# [phone]

## 2022-11-23 ENCOUNTER — TELEPHONE (OUTPATIENT)
Dept: FAMILY MEDICINE | Facility: CLINIC | Age: 62
End: 2022-11-23

## 2022-11-23 NOTE — TELEPHONE ENCOUNTER
Patient needs a copy of his factor 5 Leiden testing done 2017/2018 - aware he will not get a call back until Monday.

## 2022-11-23 NOTE — TELEPHONE ENCOUNTER
----- Message from Kathie Wagner MA sent at 11/23/2022  2:48 PM CST -----  Pt is calling to have something sent to 's office stating  he has the blood clot disease so she can get his lovenox shot covered by the insurance for his December flight. Pt is asking for call when this has been done. [phone]

## 2022-11-29 NOTE — TELEPHONE ENCOUNTER
Please call and let patient know the results are now scanned in his media - he can come  a copy any time.

## 2022-12-06 DIAGNOSIS — R19.7 DIARRHEA, UNSPECIFIED TYPE: Primary | ICD-10-CM

## 2022-12-06 RX ORDER — DIPHENOXYLATE HYDROCHLORIDE AND ATROPINE SULFATE 2.5; .025 MG/1; MG/1
1 TABLET ORAL 4 TIMES DAILY PRN
Qty: 30 TABLET | Refills: 0 | Status: SHIPPED | OUTPATIENT
Start: 2022-12-06 | End: 2022-12-19

## 2022-12-06 NOTE — TELEPHONE ENCOUNTER
Per Dr. Norman recommends:  1) Stay on liquids, soups, jello, sandwiches until diarrhea resolves  2) Lomotil 1 tab 4 times (QID) for diarrhea #30     Spoke with patient, states his diarrhea is currently mild. He will  medication when called by pharmacy.

## 2022-12-06 NOTE — TELEPHONE ENCOUNTER
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 Started on 12/2/2022 with excess gas. Went 6 times on 12/2/2022 and has had diarrhea since. Someone gave him a green pill, he took green pill does not know what he took but it helped. Took 2 more pills again and was great all day. Yesterday Had yogurt and cereal. At dinner had Progresso chicken soup and started with diarrhea again. 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 As of this morning feels a little bloated and gassy. No fever, no vomiting, a little nausea. Needs advice on treating. Also, has pain on leg left by ankle. Does not recall hurting his leg. Tenderness to touch, feels sensitive. Right leg is a little sensitive above the ankle as well. Took blood pressure this morning and drank water. At the moment feels bloated has not eaten today. He may eat some yogurt in a bit. Please advice.

## 2022-12-06 NOTE — TELEPHONE ENCOUNTER
----- Message from Margarette Alford sent at 12/6/2022  9:42 AM CST -----  Patient called and stated that he would like to be seen today he feels he has stomach virus please give him a call at  [phone]

## 2022-12-13 ENCOUNTER — OFFICE VISIT (OUTPATIENT)
Dept: HEMATOLOGY/ONCOLOGY | Facility: CLINIC | Age: 62
End: 2022-12-13
Payer: COMMERCIAL

## 2022-12-13 VITALS
RESPIRATION RATE: 18 BRPM | BODY MASS INDEX: 32.2 KG/M2 | WEIGHT: 243 LBS | SYSTOLIC BLOOD PRESSURE: 132 MMHG | TEMPERATURE: 98 F | HEIGHT: 73 IN | DIASTOLIC BLOOD PRESSURE: 64 MMHG | HEART RATE: 76 BPM

## 2022-12-13 DIAGNOSIS — B35.6 TINEA CRURIS: ICD-10-CM

## 2022-12-13 DIAGNOSIS — D68.51 HOMOZYGOUS FACTOR V LEIDEN MUTATION: ICD-10-CM

## 2022-12-13 PROCEDURE — 3078F PR MOST RECENT DIASTOLIC BLOOD PRESSURE < 80 MM HG: ICD-10-PCS | Mod: CPTII,S$GLB,, | Performed by: INTERNAL MEDICINE

## 2022-12-13 PROCEDURE — 4010F PR ACE/ARB THEARPY RXD/TAKEN: ICD-10-PCS | Mod: CPTII,S$GLB,, | Performed by: INTERNAL MEDICINE

## 2022-12-13 PROCEDURE — 99204 OFFICE O/P NEW MOD 45 MIN: CPT | Mod: S$GLB,,, | Performed by: INTERNAL MEDICINE

## 2022-12-13 PROCEDURE — 1159F PR MEDICATION LIST DOCUMENTED IN MEDICAL RECORD: ICD-10-PCS | Mod: CPTII,S$GLB,, | Performed by: INTERNAL MEDICINE

## 2022-12-13 PROCEDURE — 1159F MED LIST DOCD IN RCRD: CPT | Mod: CPTII,S$GLB,, | Performed by: INTERNAL MEDICINE

## 2022-12-13 PROCEDURE — 3008F PR BODY MASS INDEX (BMI) DOCUMENTED: ICD-10-PCS | Mod: CPTII,S$GLB,, | Performed by: INTERNAL MEDICINE

## 2022-12-13 PROCEDURE — 3075F SYST BP GE 130 - 139MM HG: CPT | Mod: CPTII,S$GLB,, | Performed by: INTERNAL MEDICINE

## 2022-12-13 PROCEDURE — 3008F BODY MASS INDEX DOCD: CPT | Mod: CPTII,S$GLB,, | Performed by: INTERNAL MEDICINE

## 2022-12-13 PROCEDURE — 3075F PR MOST RECENT SYSTOLIC BLOOD PRESS GE 130-139MM HG: ICD-10-PCS | Mod: CPTII,S$GLB,, | Performed by: INTERNAL MEDICINE

## 2022-12-13 PROCEDURE — 99204 PR OFFICE/OUTPT VISIT, NEW, LEVL IV, 45-59 MIN: ICD-10-PCS | Mod: S$GLB,,, | Performed by: INTERNAL MEDICINE

## 2022-12-13 PROCEDURE — 3078F DIAST BP <80 MM HG: CPT | Mod: CPTII,S$GLB,, | Performed by: INTERNAL MEDICINE

## 2022-12-13 PROCEDURE — 4010F ACE/ARB THERAPY RXD/TAKEN: CPT | Mod: CPTII,S$GLB,, | Performed by: INTERNAL MEDICINE

## 2022-12-13 RX ORDER — CLOTRIMAZOLE AND BETAMETHASONE DIPROPIONATE 10; .64 MG/G; MG/G
CREAM TOPICAL 2 TIMES DAILY
Qty: 30 G | Refills: 2 | Status: SHIPPED | OUTPATIENT
Start: 2022-12-13 | End: 2023-09-25 | Stop reason: SDUPTHER

## 2022-12-13 RX ORDER — ENOXAPARIN SODIUM 100 MG/ML
30 INJECTION SUBCUTANEOUS DAILY
Qty: 7 EACH | Refills: 0 | Status: SHIPPED | OUTPATIENT
Start: 2022-12-13 | End: 2023-12-13

## 2022-12-13 NOTE — TELEPHONE ENCOUNTER
----- Message from Ela Madden sent at 12/13/2022  9:04 AM CST -----  Please refill coltrimazole and betamethasone 1% will vesna kessler

## 2022-12-13 NOTE — ASSESSMENT & PLAN NOTE
Patient is known homozygous FVL and has never had a clotting issues.  I discussed this fact that his risk does increase as he gets older and I would take an 81mg ASA and would use prophylaxis for procedures and long travel and discussed this today.  Will prescribe Lovenox when this is needed.   He has not had an FH of large/severe clotting issues although there is some clotting in the homozygous members of the family.  Will need to be very careful moving into the future and will assist with surgical clearances and see yearly or as needed.  Discussed this today.

## 2022-12-19 ENCOUNTER — OFFICE VISIT (OUTPATIENT)
Dept: FAMILY MEDICINE | Facility: CLINIC | Age: 62
End: 2022-12-19
Payer: COMMERCIAL

## 2022-12-19 VITALS
DIASTOLIC BLOOD PRESSURE: 74 MMHG | TEMPERATURE: 99 F | SYSTOLIC BLOOD PRESSURE: 130 MMHG | OXYGEN SATURATION: 98 % | BODY MASS INDEX: 32.91 KG/M2 | HEART RATE: 88 BPM | HEIGHT: 72 IN | WEIGHT: 243 LBS

## 2022-12-19 DIAGNOSIS — I10 ESSENTIAL HYPERTENSION: ICD-10-CM

## 2022-12-19 DIAGNOSIS — F41.1 GENERALIZED ANXIETY DISORDER: ICD-10-CM

## 2022-12-19 DIAGNOSIS — J06.9 VIRAL URI WITH COUGH: Primary | ICD-10-CM

## 2022-12-19 DIAGNOSIS — R05.9 COUGH, UNSPECIFIED TYPE: ICD-10-CM

## 2022-12-19 LAB
CTP QC/QA: YES
CTP QC/QA: YES
FLUAV AG NPH QL: NEGATIVE
FLUBV AG NPH QL: NEGATIVE
SARS-COV-2 RDRP RESP QL NAA+PROBE: NEGATIVE

## 2022-12-19 PROCEDURE — 87804 POCT INFLUENZA A/B: ICD-10-PCS | Mod: QW,,, | Performed by: PHYSICIAN ASSISTANT

## 2022-12-19 PROCEDURE — 1160F PR REVIEW ALL MEDS BY PRESCRIBER/CLIN PHARMACIST DOCUMENTED: ICD-10-PCS | Mod: CPTII,S$GLB,, | Performed by: PHYSICIAN ASSISTANT

## 2022-12-19 PROCEDURE — 3008F BODY MASS INDEX DOCD: CPT | Mod: CPTII,S$GLB,, | Performed by: PHYSICIAN ASSISTANT

## 2022-12-19 PROCEDURE — 99213 OFFICE O/P EST LOW 20 MIN: CPT | Mod: 25,S$GLB,, | Performed by: PHYSICIAN ASSISTANT

## 2022-12-19 PROCEDURE — 3075F SYST BP GE 130 - 139MM HG: CPT | Mod: CPTII,S$GLB,, | Performed by: PHYSICIAN ASSISTANT

## 2022-12-19 PROCEDURE — 87635: ICD-10-PCS | Mod: QW,S$GLB,, | Performed by: PHYSICIAN ASSISTANT

## 2022-12-19 PROCEDURE — 1159F MED LIST DOCD IN RCRD: CPT | Mod: CPTII,S$GLB,, | Performed by: PHYSICIAN ASSISTANT

## 2022-12-19 PROCEDURE — 4010F ACE/ARB THERAPY RXD/TAKEN: CPT | Mod: CPTII,S$GLB,, | Performed by: PHYSICIAN ASSISTANT

## 2022-12-19 PROCEDURE — 99213 PR OFFICE/OUTPT VISIT, EST, LEVL III, 20-29 MIN: ICD-10-PCS | Mod: 25,S$GLB,, | Performed by: PHYSICIAN ASSISTANT

## 2022-12-19 PROCEDURE — 1159F PR MEDICATION LIST DOCUMENTED IN MEDICAL RECORD: ICD-10-PCS | Mod: CPTII,S$GLB,, | Performed by: PHYSICIAN ASSISTANT

## 2022-12-19 PROCEDURE — 3008F PR BODY MASS INDEX (BMI) DOCUMENTED: ICD-10-PCS | Mod: CPTII,S$GLB,, | Performed by: PHYSICIAN ASSISTANT

## 2022-12-19 PROCEDURE — 3078F DIAST BP <80 MM HG: CPT | Mod: CPTII,S$GLB,, | Performed by: PHYSICIAN ASSISTANT

## 2022-12-19 PROCEDURE — 3078F PR MOST RECENT DIASTOLIC BLOOD PRESSURE < 80 MM HG: ICD-10-PCS | Mod: CPTII,S$GLB,, | Performed by: PHYSICIAN ASSISTANT

## 2022-12-19 PROCEDURE — 87804 INFLUENZA ASSAY W/OPTIC: CPT | Mod: QW,,, | Performed by: PHYSICIAN ASSISTANT

## 2022-12-19 PROCEDURE — 87635 SARS-COV-2 COVID-19 AMP PRB: CPT | Mod: QW,S$GLB,, | Performed by: PHYSICIAN ASSISTANT

## 2022-12-19 PROCEDURE — 3075F PR MOST RECENT SYSTOLIC BLOOD PRESS GE 130-139MM HG: ICD-10-PCS | Mod: CPTII,S$GLB,, | Performed by: PHYSICIAN ASSISTANT

## 2022-12-19 PROCEDURE — 1160F RVW MEDS BY RX/DR IN RCRD: CPT | Mod: CPTII,S$GLB,, | Performed by: PHYSICIAN ASSISTANT

## 2022-12-19 PROCEDURE — 4010F PR ACE/ARB THEARPY RXD/TAKEN: ICD-10-PCS | Mod: CPTII,S$GLB,, | Performed by: PHYSICIAN ASSISTANT

## 2022-12-19 RX ORDER — VITAMIN B COMPLEX
1 CAPSULE ORAL DAILY
COMMUNITY

## 2022-12-19 RX ORDER — FLUTICASONE PROPIONATE 50 MCG
1 SPRAY, SUSPENSION (ML) NASAL 2 TIMES DAILY
COMMUNITY
Start: 2022-11-18

## 2022-12-19 RX ORDER — TITANIUM DIOXIDE, OCTINOXATE, ZINC OXIDE 4.61; 1.6; .78 G/40ML; G/40ML; G/40ML
1 CREAM TOPICAL DAILY
COMMUNITY

## 2022-12-19 RX ORDER — TURMERIC 100 %
1 POWDER (GRAM) MISCELLANEOUS DAILY
COMMUNITY

## 2022-12-19 RX ORDER — GARLIC 1000 MG
1 CAPSULE ORAL DAILY
COMMUNITY

## 2022-12-19 RX ORDER — DICYCLOMINE HYDROCHLORIDE 20 MG/1
1 TABLET ORAL
COMMUNITY
Start: 2022-12-09 | End: 2024-04-02 | Stop reason: SDUPTHER

## 2022-12-19 RX ORDER — PROMETHAZINE HYDROCHLORIDE AND DEXTROMETHORPHAN HYDROBROMIDE 6.25; 15 MG/5ML; MG/5ML
5 SYRUP ORAL NIGHTLY PRN
Qty: 118 ML | Refills: 0 | Status: SHIPPED | OUTPATIENT
Start: 2022-12-19 | End: 2022-12-24

## 2022-12-19 RX ORDER — BENZONATATE 200 MG/1
200 CAPSULE ORAL 3 TIMES DAILY PRN
Qty: 30 CAPSULE | Refills: 0 | Status: SHIPPED | OUTPATIENT
Start: 2022-12-19 | End: 2022-12-28 | Stop reason: SDUPTHER

## 2022-12-19 RX ORDER — TYROSINE 500 MG
1 CAPSULE ORAL NIGHTLY
COMMUNITY

## 2022-12-19 RX ORDER — FLUTICASONE PROPIONATE 50 MCG
1 SPRAY, SUSPENSION (ML) NASAL DAILY
COMMUNITY
Start: 2022-10-21 | End: 2022-12-28 | Stop reason: SDUPTHER

## 2022-12-19 RX ORDER — HYDROCORTISONE ACETATE 0.5 %
1 CREAM (GRAM) TOPICAL DAILY
COMMUNITY

## 2022-12-19 RX ORDER — PHENYLEPHRINE HCL 10 MG
1 TABLET ORAL DAILY
COMMUNITY

## 2022-12-19 RX ORDER — IBUPROFEN 100 MG/5ML
1 SUSPENSION, ORAL (FINAL DOSE FORM) ORAL DAILY
COMMUNITY

## 2022-12-19 NOTE — PROGRESS NOTES
"  SUBJECTIVE:    Patient ID: Otto Dawson is a 62 y.o. male.    Chief Complaint: Cough (No bottles/ started 12/16/2022 coughing no fever no nausea no vomiting some diarrhea due to prior stomach upset 1 week ago/ home remedies vit c tussin mucinex essential oil/ mp)    Pt is a 62 y.o. male who presents today for a sick visit with a CC of "a cough." This cough started acutely last Friday and has been gradually improving since.  The cough is dry and nonproductive.  Occasionally, the it will clear white phlegm, but this is not frequent.  He reports a 75% improvement in his symptoms since onset and reports he is feeling significantly better since last Friday.  He is traveling this coming Wednesday and is requesting to be tested for influenza and COVID-19 secondary to his travels.  He denies any fever, sinus congestion,  wheezing, SOB, diarrhea, or myalgia.    He has a history of sinuplasty in 2018.    Office Visit on 12/19/2022   Component Date Value Ref Range Status    Rapid Influenza A Ag 12/19/2022 Negative  Negative Final    Rapid Influenza B Ag 12/19/2022 Negative  Negative Final     Acceptable 12/19/2022 Yes   Final    POC Rapid COVID 12/19/2022 Negative  Negative Final     Acceptable 12/19/2022 Yes   Final       Past Medical History:   Diagnosis Date    Anxiety     Arthritis     Depression     Factor 5 Leiden mutation, heterozygous     Gastritis 9/16/2019    Generalized anxiety disorder     History of nuclear stress test 2014    Hyperlipidemia     Hypertension      Past Surgical History:   Procedure Laterality Date    COLONOSCOPY  2018    Dr Don-rtc 5 yrs     HERNIA REPAIR      SINUS SURGERY  2018    nasal septoplasty/balloon sinuplasty- Dr Roman     Family History   Problem Relation Age of Onset    Heart disease Father        Marital Status: Single  Alcohol History:  reports current alcohol use.  Tobacco History:  reports that he quit smoking about 24 years ago. His smoking " use included cigarettes. He has never used smokeless tobacco.  Drug History:  reports no history of drug use.    Health Maintenance Topics with due status: Not Due       Topic Last Completion Date    TETANUS VACCINE 07/20/2015    Colorectal Cancer Screening 06/06/2018    Lipid Panel 03/11/2022     Immunization History   Administered Date(s) Administered    COVID-19, MRNA, LN-S, PF (MODERNA FULL 0.5 ML DOSE) 08/22/2020    Influenza - Trivalent (ADULT) 10/08/2014    Influenza - Trivalent - Recombinant - PF 09/20/2018    Tdap 04/20/2015, 07/20/2015       Review of patient's allergies indicates:   Allergen Reactions    Cefuroxime axetil     Ciprofloxacin Other (See Comments)    Reno Itching    Sulfacetamide sodium-sulfur Other (See Comments)    Tetracycline hcl Other (See Comments)       Current Outpatient Medications:     fluticasone propionate (FLONASE) 50 mcg/actuation nasal spray, 1 spray by Each Nostril route once daily., Disp: , Rfl:     5-hydroxytryptophan, 5-HTP, 50 mg Cap, Take 1 capsule by mouth nightly., Disp: , Rfl:     amLODIPine (NORVASC) 10 MG tablet, Take 1 tablet (10 mg total) by mouth once daily., Disp: 90 tablet, Rfl: 1    ascorbic acid, vitamin C, (VITAMIN C) 1000 MG tablet, 1 tablet once daily., Disp: , Rfl:     ascorbic acid, vitamin C, (VITAMIN C) 1000 MG tablet, Take 1 tablet by mouth once daily., Disp: , Rfl:     atenoloL (TENORMIN) 50 MG tablet, Take 1 tablet (50 mg total) by mouth 2 (two) times daily., Disp: 180 tablet, Rfl: 1    b complex vitamins capsule, Take 1 tablet by mouth once daily., Disp: , Rfl:     benzonatate (TESSALON) 200 MG capsule, Take 1 capsule (200 mg total) by mouth 3 (three) times daily as needed for Cough., Disp: 30 capsule, Rfl: 0    CALCIUM-MAGNESIUM ORAL, 1 tablet Daily., Disp: , Rfl:     cinnamon bark 500 mg capsule, 1 tablet Daily., Disp: , Rfl:     cinnamon bark 500 mg capsule, Take 1 tablet by mouth once daily., Disp: , Rfl:     clotrimazole-betamethasone 1-0.05%  (LOTRISONE) cream, Apply topically 2 (two) times daily., Disp: 30 g, Rfl: 2    co-enzyme Q-10 30 mg capsule, Take 30 mg by mouth 3 (three) times daily., Disp: , Rfl:     cranberry 400 mg Cap, 1 capsule once daily., Disp: , Rfl:     cranberry 400 mg Cap, Take 1 capsule by mouth once daily., Disp: , Rfl:     dicyclomine (BENTYL) 20 mg tablet, Take 1 tablet by mouth as needed., Disp: , Rfl:     enoxaparin (LOVENOX) 30 mg/0.3 mL Syrg, Inject 0.3 mLs (30 mg total) into the skin once daily. During travel days, Disp: 7 each, Rfl: 0    flaxseed oiL Oil, 1 capsule by Other route once daily., Disp: , Rfl:     fluticasone propionate (FLONASE) 50 mcg/actuation nasal spray, 1 spray by Each Nostril route 2 (two) times daily., Disp: , Rfl:     garlic 1,000 mg Cap, 1 capsule once daily., Disp: , Rfl:     garlic 1,000 mg Cap, Take 1 capsule by mouth once daily., Disp: , Rfl:     glucosam reed dip-chondroit-C-Mn 463-104-75-3 mg Cap, 1 tablet once daily., Disp: , Rfl:     glucosam reed dip-chondroit-C-Mn 011-905-77-3 mg Cap, Take 1 tablet by mouth once daily., Disp: , Rfl:     hydroCHLOROthiazide (HYDRODIURIL) 25 MG tablet, Take 1 tablet (25 mg total) by mouth once daily., Disp: 90 tablet, Rfl: 1    hydrocortisone 2.5 % cream, Apply topically once daily., Disp: 15 g, Rfl: 2    LORazepam (ATIVAN) 0.5 MG tablet, Take 1 tablet (0.5 mg total) by mouth as needed., Disp: 30 tablet, Rfl: 3    losartan (COZAAR) 50 MG tablet, Take 1 tablet (50 mg total) by mouth once daily., Disp: 90 tablet, Rfl: 1    magnesium oxide (MAG-OX) 400 mg (241.3 mg magnesium) tablet, Take 400 mg by mouth once daily., Disp: , Rfl:     mv-mins-folic-lycopene-ginkgo 400-300-120 mcg-mcg-mg Tab, 1 tablet once daily., Disp: , Rfl:     PHYTOSTEROL ORAL, 1 tablet once daily., Disp: , Rfl:     PHYTOSTEROL ORAL, Take 1 tablet by mouth once daily., Disp: , Rfl:     promethazine-dextromethorphan (PROMETHAZINE-DM) 6.25-15 mg/5 mL Syrp, Take 5 mLs by mouth nightly as needed., Disp:  118 mL, Rfl: 0    turmeric, bulk, 100 % Powd, 1 tablet once daily., Disp: , Rfl:     turmeric, bulk, 100 % Powd, Take 1 tablet by mouth once daily., Disp: , Rfl:     vitamin B comp with C no.4 150 mg Tab, 1 tablet once daily., Disp: , Rfl:     vitamin D (VITAMIN D3) 1000 units Tab, Take 1,000 Units by mouth once daily., Disp: , Rfl:     Review of Systems   Constitutional:  Negative for chills, fatigue and fever.   HENT:  Negative for congestion, ear discharge, ear pain, postnasal drip, rhinorrhea and sore throat.    Respiratory:  Positive for cough. Negative for apnea, chest tightness, shortness of breath and wheezing.    Cardiovascular:  Negative for chest pain and palpitations.   Gastrointestinal:  Negative for abdominal pain, constipation, diarrhea, nausea and vomiting.   Genitourinary: Negative.    Musculoskeletal:  Negative for arthralgias and myalgias.   Skin:  Negative for rash.   Neurological:  Negative for dizziness, syncope, light-headedness and headaches.   Psychiatric/Behavioral:  Negative for behavioral problems.         Objective:      Vitals:    12/19/22 1422   BP: 130/74   Pulse: 88   Temp: 98.5 °F (36.9 °C)   SpO2: 98%   Weight: 110.2 kg (243 lb)   Height: 6' (1.829 m)     Physical Exam  Vitals reviewed.   Constitutional:       General: He is not in acute distress.     Appearance: Normal appearance. He is not ill-appearing or toxic-appearing.   HENT:      Head: Normocephalic and atraumatic.      Right Ear: Tympanic membrane, ear canal and external ear normal. There is no impacted cerumen.      Left Ear: Tympanic membrane, ear canal and external ear normal. There is no impacted cerumen.      Nose: Nose normal. No rhinorrhea.      Mouth/Throat:      Mouth: Mucous membranes are moist.      Pharynx: Oropharynx is clear. No oropharyngeal exudate or posterior oropharyngeal erythema.   Eyes:      General: No scleral icterus.     Conjunctiva/sclera: Conjunctivae normal.   Neck:      Vascular: No carotid  bruit.   Cardiovascular:      Rate and Rhythm: Normal rate and regular rhythm.      Pulses: Normal pulses.      Heart sounds: Normal heart sounds. No murmur heard.    No friction rub.   Pulmonary:      Effort: Pulmonary effort is normal. No respiratory distress.      Breath sounds: Normal breath sounds. No stridor. No wheezing, rhonchi or rales.      Comments: Patient breathing comfortably on room air with no use of respiratory accessory muscles noted on inspection.  Adequate breath sounds, with no signs of consolidation appreciated upon auscultation in the diffuse bilateral lung fields.  Abdominal:      Tenderness: There is no abdominal tenderness. There is no guarding or rebound.   Musculoskeletal:      Right lower leg: No edema.      Left lower leg: No edema.   Skin:     General: Skin is warm and dry.      Coloration: Skin is not jaundiced or pale.   Neurological:      Mental Status: He is alert. Mental status is at baseline.      Gait: Gait normal.   Psychiatric:         Mood and Affect: Mood normal.         Behavior: Behavior normal. Behavior is cooperative.         Assessment:       1. Viral URI with cough    2. Cough, unspecified type    3. Essential hypertension    4. Generalized anxiety disorder           Plan:       Viral URI with cough  POCT COVID-19 and Influenza A/B swabs today - all NEGATIVE.  Reassurance provided as symptoms are gradually improving.  Continue OTC measures - analgesics and cough drops.  Start p.r.n Tessalon 200mg t.i.d and Promethazine-DM cough syrup for cough suppression q.h.s..  -     POCT Influenza A/B  -     POCT COVID-19 Rapid Screening  -     benzonatate (TESSALON) 200 MG capsule; Take 1 capsule (200 mg total) by mouth 3 (three) times daily as needed for Cough.  Dispense: 30 capsule; Refill: 0  -     promethazine-dextromethorphan (PROMETHAZINE-DM) 6.25-15 mg/5 mL Syrp; Take 5 mLs by mouth nightly as needed.  Dispense: 118 mL; Refill: 0    Cough, unspecified type  -     POCT  Influenza A/B  -     POCT COVID-19 Rapid Screening  -     benzonatate (TESSALON) 200 MG capsule; Take 1 capsule (200 mg total) by mouth 3 (three) times daily as needed for Cough.  Dispense: 30 capsule; Refill: 0  -     promethazine-dextromethorphan (PROMETHAZINE-DM) 6.25-15 mg/5 mL Syrp; Take 5 mLs by mouth nightly as needed.  Dispense: 118 mL; Refill: 0    Essential hypertension  Stable and well controlled. Continue as is.     Generalized anxiety disorder      Follow up if symptoms worsen or fail to improve, for Viral URI.        12/19/2022 Jemal Bowden PA-C

## 2022-12-20 ENCOUNTER — TELEPHONE (OUTPATIENT)
Dept: FAMILY MEDICINE | Facility: CLINIC | Age: 62
End: 2022-12-20

## 2022-12-20 DIAGNOSIS — J06.9 VIRAL URI WITH COUGH: Primary | ICD-10-CM

## 2022-12-20 RX ORDER — METHYLPREDNISOLONE 4 MG/1
TABLET ORAL
Qty: 21 EACH | Refills: 0 | Status: SHIPPED | OUTPATIENT
Start: 2022-12-20 | End: 2023-01-10

## 2022-12-28 ENCOUNTER — OFFICE VISIT (OUTPATIENT)
Dept: FAMILY MEDICINE | Facility: CLINIC | Age: 62
End: 2022-12-28
Payer: COMMERCIAL

## 2022-12-28 VITALS
SYSTOLIC BLOOD PRESSURE: 116 MMHG | WEIGHT: 244 LBS | DIASTOLIC BLOOD PRESSURE: 68 MMHG | HEART RATE: 80 BPM | HEIGHT: 72 IN | TEMPERATURE: 98 F | BODY MASS INDEX: 33.05 KG/M2

## 2022-12-28 DIAGNOSIS — I10 ESSENTIAL HYPERTENSION: ICD-10-CM

## 2022-12-28 DIAGNOSIS — F41.1 GENERALIZED ANXIETY DISORDER: ICD-10-CM

## 2022-12-28 DIAGNOSIS — R05.9 COUGH, UNSPECIFIED TYPE: ICD-10-CM

## 2022-12-28 DIAGNOSIS — J32.4 PANSINUSITIS, UNSPECIFIED CHRONICITY: Primary | ICD-10-CM

## 2022-12-28 PROCEDURE — 1160F RVW MEDS BY RX/DR IN RCRD: CPT | Mod: CPTII,S$GLB,, | Performed by: PHYSICIAN ASSISTANT

## 2022-12-28 PROCEDURE — 1160F PR REVIEW ALL MEDS BY PRESCRIBER/CLIN PHARMACIST DOCUMENTED: ICD-10-PCS | Mod: CPTII,S$GLB,, | Performed by: PHYSICIAN ASSISTANT

## 2022-12-28 PROCEDURE — 99214 OFFICE O/P EST MOD 30 MIN: CPT | Mod: S$GLB,,, | Performed by: PHYSICIAN ASSISTANT

## 2022-12-28 PROCEDURE — 4010F ACE/ARB THERAPY RXD/TAKEN: CPT | Mod: CPTII,S$GLB,, | Performed by: PHYSICIAN ASSISTANT

## 2022-12-28 PROCEDURE — 3078F DIAST BP <80 MM HG: CPT | Mod: CPTII,S$GLB,, | Performed by: PHYSICIAN ASSISTANT

## 2022-12-28 PROCEDURE — 3008F BODY MASS INDEX DOCD: CPT | Mod: CPTII,S$GLB,, | Performed by: PHYSICIAN ASSISTANT

## 2022-12-28 PROCEDURE — 3074F SYST BP LT 130 MM HG: CPT | Mod: CPTII,S$GLB,, | Performed by: PHYSICIAN ASSISTANT

## 2022-12-28 PROCEDURE — 1159F MED LIST DOCD IN RCRD: CPT | Mod: CPTII,S$GLB,, | Performed by: PHYSICIAN ASSISTANT

## 2022-12-28 PROCEDURE — 3008F PR BODY MASS INDEX (BMI) DOCUMENTED: ICD-10-PCS | Mod: CPTII,S$GLB,, | Performed by: PHYSICIAN ASSISTANT

## 2022-12-28 PROCEDURE — 1159F PR MEDICATION LIST DOCUMENTED IN MEDICAL RECORD: ICD-10-PCS | Mod: CPTII,S$GLB,, | Performed by: PHYSICIAN ASSISTANT

## 2022-12-28 PROCEDURE — 3078F PR MOST RECENT DIASTOLIC BLOOD PRESSURE < 80 MM HG: ICD-10-PCS | Mod: CPTII,S$GLB,, | Performed by: PHYSICIAN ASSISTANT

## 2022-12-28 PROCEDURE — 99214 PR OFFICE/OUTPT VISIT, EST, LEVL IV, 30-39 MIN: ICD-10-PCS | Mod: S$GLB,,, | Performed by: PHYSICIAN ASSISTANT

## 2022-12-28 PROCEDURE — 3074F PR MOST RECENT SYSTOLIC BLOOD PRESSURE < 130 MM HG: ICD-10-PCS | Mod: CPTII,S$GLB,, | Performed by: PHYSICIAN ASSISTANT

## 2022-12-28 PROCEDURE — 4010F PR ACE/ARB THEARPY RXD/TAKEN: ICD-10-PCS | Mod: CPTII,S$GLB,, | Performed by: PHYSICIAN ASSISTANT

## 2022-12-28 RX ORDER — AZITHROMYCIN 250 MG/1
TABLET, FILM COATED ORAL
Qty: 6 TABLET | Refills: 0 | Status: SHIPPED | OUTPATIENT
Start: 2022-12-28 | End: 2023-01-02

## 2022-12-28 RX ORDER — CETIRIZINE HYDROCHLORIDE 10 MG/1
10 TABLET ORAL DAILY
Qty: 90 TABLET | Refills: 1 | Status: SHIPPED | OUTPATIENT
Start: 2022-12-28 | End: 2023-07-17 | Stop reason: SDUPTHER

## 2022-12-28 RX ORDER — BENZONATATE 200 MG/1
200 CAPSULE ORAL 3 TIMES DAILY PRN
Qty: 30 CAPSULE | Refills: 2 | Status: SHIPPED | OUTPATIENT
Start: 2022-12-28 | End: 2023-01-07

## 2022-12-28 RX ORDER — CODEINE PHOSPHATE AND GUAIFENESIN 10; 100 MG/5ML; MG/5ML
5 SOLUTION ORAL EVERY 8 HOURS PRN
Qty: 118 ML | Refills: 0 | Status: SHIPPED | OUTPATIENT
Start: 2022-12-28 | End: 2023-01-02

## 2022-12-28 RX ORDER — ALBUTEROL SULFATE 90 UG/1
2 AEROSOL, METERED RESPIRATORY (INHALATION) EVERY 6 HOURS PRN
Qty: 18 G | Refills: 2 | Status: SHIPPED | OUTPATIENT
Start: 2022-12-28 | End: 2023-09-25 | Stop reason: SDUPTHER

## 2022-12-28 NOTE — PROGRESS NOTES
"  SUBJECTIVE:    Patient ID: Otto Dawson is a 62 y.o. male.    Chief Complaint: Follow-up (Ears feel full now, thinks might be from using the netti pot, he still feels sputum is stuck but is coming up better now, no bottles// SW)    Pt is a 62 y.o. male who presents today for a sick visit with a CC of " the cough is better, but still present and the ears feel fluid filled." He was recently seen on 12/19/2022 regarding a viral URI.  He successfully completed a Medrol Dosepak.  He went on a trip to [place] last week, and reports while in [facility], his symptoms were absent.  Upon returning, he reports his cough re-presented.  His cough is occasionally productive of scant, white phlegm.  Accompanied with this cough is nasal congestion, a postnasal drip, and bilateral ear fullness.  In an attempt to alleviate these symptoms, he has been using his Neti pot and taking Promethazine-DM cough syrup.  Both of these measures are effective at alleviating his symptoms.  He denies any fever, chills, diarrhea, SOB, or apnea.  He reports occasional wheezing after coughing fits.    Office Visit on 12/19/2022   Component Date Value Ref Range Status    Rapid Influenza A Ag 12/19/2022 Negative  Negative Final    Rapid Influenza B Ag 12/19/2022 Negative  Negative Final     Acceptable 12/19/2022 Yes   Final    POC Rapid COVID 12/19/2022 Negative  Negative Final     Acceptable 12/19/2022 Yes   Final       Past Medical History:   Diagnosis Date    Anxiety     Arthritis     Depression     Factor 5 Leiden mutation, heterozygous     Gastritis 9/16/2019    Generalized anxiety disorder     History of nuclear stress test 2014    Hyperlipidemia     Hypertension      Past Surgical History:   Procedure Laterality Date    COLONOSCOPY  2018    Dr Don-rtc 5 yrs     HERNIA REPAIR      SINUS SURGERY  2018    nasal septoplasty/balloon sinuplasty- Dr Roman     Family History   Problem Relation Age of Onset    Heart " disease Father        Marital Status: Single  Alcohol History:  reports current alcohol use.  Tobacco History:  reports that he quit smoking about 25 years ago. His smoking use included cigarettes. He has never used smokeless tobacco.  Drug History:  reports no history of drug use.    Health Maintenance Topics with due status: Not Due       Topic Last Completion Date    TETANUS VACCINE 07/20/2015    Colorectal Cancer Screening 06/06/2018    Lipid Panel 03/11/2022     Immunization History   Administered Date(s) Administered    COVID-19, MRNA, LN-S, PF (MODERNA FULL 0.5 ML DOSE) 08/22/2020    Influenza - Trivalent (ADULT) 10/08/2014    Influenza - Trivalent - Recombinant - PF 09/20/2018    Tdap 04/20/2015, 07/20/2015       Review of patient's allergies indicates:   Allergen Reactions    Cefuroxime axetil     Ciprofloxacin Other (See Comments)    Reno Itching    Sulfacetamide sodium-sulfur Other (See Comments)    Tetracycline hcl Other (See Comments)       Current Outpatient Medications:     5-hydroxytryptophan, 5-HTP, 50 mg Cap, Take 1 capsule by mouth nightly., Disp: , Rfl:     albuterol (VENTOLIN HFA) 90 mcg/actuation inhaler, Inhale 2 puffs into the lungs every 6 (six) hours as needed for Wheezing. Rescue, Disp: 18 g, Rfl: 2    amLODIPine (NORVASC) 10 MG tablet, Take 1 tablet (10 mg total) by mouth once daily., Disp: 90 tablet, Rfl: 1    ascorbic acid, vitamin C, (VITAMIN C) 1000 MG tablet, 1 tablet once daily., Disp: , Rfl:     ascorbic acid, vitamin C, (VITAMIN C) 1000 MG tablet, Take 1 tablet by mouth once daily., Disp: , Rfl:     atenoloL (TENORMIN) 50 MG tablet, Take 1 tablet (50 mg total) by mouth 2 (two) times daily., Disp: 180 tablet, Rfl: 1    azithromycin (Z-AMALIA) 250 MG tablet, Take 2 tablets by mouth on day 1; Take 1 tablet by mouth on days 2-5, Disp: 6 tablet, Rfl: 0    b complex vitamins capsule, Take 1 tablet by mouth once daily., Disp: , Rfl:     benzonatate (TESSALON) 200 MG capsule, Take 1 capsule  (200 mg total) by mouth 3 (three) times daily as needed for Cough., Disp: 30 capsule, Rfl: 2    CALCIUM-MAGNESIUM ORAL, 1 tablet Daily., Disp: , Rfl:     cetirizine (ZYRTEC) 10 MG tablet, Take 1 tablet (10 mg total) by mouth once daily., Disp: 90 tablet, Rfl: 1    cinnamon bark 500 mg capsule, 1 tablet Daily., Disp: , Rfl:     cinnamon bark 500 mg capsule, Take 1 tablet by mouth once daily., Disp: , Rfl:     clotrimazole-betamethasone 1-0.05% (LOTRISONE) cream, Apply topically 2 (two) times daily., Disp: 30 g, Rfl: 2    co-enzyme Q-10 30 mg capsule, Take 30 mg by mouth 3 (three) times daily., Disp: , Rfl:     cranberry 400 mg Cap, 1 capsule once daily., Disp: , Rfl:     cranberry 400 mg Cap, Take 1 capsule by mouth once daily., Disp: , Rfl:     dicyclomine (BENTYL) 20 mg tablet, Take 1 tablet by mouth as needed., Disp: , Rfl:     enoxaparin (LOVENOX) 30 mg/0.3 mL Syrg, Inject 0.3 mLs (30 mg total) into the skin once daily. During travel days, Disp: 7 each, Rfl: 0    flaxseed oiL Oil, 1 capsule by Other route once daily., Disp: , Rfl:     fluticasone propionate (FLONASE) 50 mcg/actuation nasal spray, 1 spray by Each Nostril route 2 (two) times daily., Disp: , Rfl:     garlic 1,000 mg Cap, 1 capsule once daily., Disp: , Rfl:     garlic 1,000 mg Cap, Take 1 capsule by mouth once daily., Disp: , Rfl:     glucosam reed dip-chondroit-C-Mn 698-681-85-3 mg Cap, 1 tablet once daily., Disp: , Rfl:     glucosam reed dip-chondroit-C-Mn 875-144-87-3 mg Cap, Take 1 tablet by mouth once daily., Disp: , Rfl:     hydroCHLOROthiazide (HYDRODIURIL) 25 MG tablet, Take 1 tablet (25 mg total) by mouth once daily., Disp: 90 tablet, Rfl: 1    hydrocortisone 2.5 % cream, Apply topically once daily., Disp: 15 g, Rfl: 2    LORazepam (ATIVAN) 0.5 MG tablet, Take 1 tablet (0.5 mg total) by mouth as needed., Disp: 30 tablet, Rfl: 3    losartan (COZAAR) 50 MG tablet, Take 1 tablet (50 mg total) by mouth once daily., Disp: 90 tablet, Rfl: 1     "magnesium oxide (MAG-OX) 400 mg (241.3 mg magnesium) tablet, Take 400 mg by mouth once daily., Disp: , Rfl:     methylPREDNISolone (MEDROL DOSEPACK) 4 mg tablet, use as directed, Disp: 21 each, Rfl: 0    mv-mins-folic-lycopene-ginkgo 400-300-120 mcg-mcg-mg Tab, 1 tablet once daily., Disp: , Rfl:     PHYTOSTEROL ORAL, 1 tablet once daily., Disp: , Rfl:     PHYTOSTEROL ORAL, Take 1 tablet by mouth once daily., Disp: , Rfl:     turmeric, bulk, 100 % Powd, 1 tablet once daily., Disp: , Rfl:     turmeric, bulk, 100 % Powd, Take 1 tablet by mouth once daily., Disp: , Rfl:     vitamin B comp with C no.4 150 mg Tab, 1 tablet once daily., Disp: , Rfl:     vitamin D (VITAMIN D3) 1000 units Tab, Take 1,000 Units by mouth once daily., Disp: , Rfl:     Review of Systems   Constitutional:  Negative for activity change, chills, fatigue and fever.   HENT:  Positive for congestion and postnasal drip. Negative for ear discharge, ear pain, rhinorrhea and sore throat.         "Ear pressure and it feels like fluid is in the ears."   Respiratory:  Positive for cough and wheezing. Negative for shortness of breath.    Cardiovascular:  Negative for chest pain and palpitations.   Gastrointestinal:  Negative for abdominal pain, constipation, diarrhea, nausea and vomiting.   Genitourinary: Negative.    Musculoskeletal:  Negative for arthralgias and myalgias.   Skin:  Negative for rash.   Neurological:  Negative for dizziness, syncope, weakness, light-headedness and headaches.   Psychiatric/Behavioral:  Negative for behavioral problems.         Objective:      Vitals:    12/28/22 1419   BP: 116/68   Pulse: 80   Temp: 98.1 °F (36.7 °C)   Weight: 110.7 kg (244 lb)   Height: 6' (1.829 m)     Physical Exam  Vitals reviewed.   Constitutional:       General: He is not in acute distress.     Appearance: Normal appearance. He is not ill-appearing or toxic-appearing.   HENT:      Head: Normocephalic and atraumatic.      Right Ear: Tympanic membrane, ear " canal and external ear normal. There is no impacted cerumen.      Left Ear: Tympanic membrane, ear canal and external ear normal. There is no impacted cerumen.      Nose: Nose normal. No congestion or rhinorrhea.      Mouth/Throat:      Mouth: Mucous membranes are moist.      Pharynx: Oropharynx is clear. Uvula midline. No pharyngeal swelling, oropharyngeal exudate, posterior oropharyngeal erythema or uvula swelling.   Eyes:      General: No scleral icterus.     Conjunctiva/sclera: Conjunctivae normal.   Cardiovascular:      Rate and Rhythm: Normal rate and regular rhythm.      Pulses: Normal pulses.      Heart sounds: Normal heart sounds. No murmur heard.  Pulmonary:      Effort: Pulmonary effort is normal. No respiratory distress.      Breath sounds: Normal breath sounds. No stridor. No wheezing, rhonchi or rales.      Comments: Patient breathing comfortably on room air with no use of respiratory accessory muscles noted on inspection.  Adequate breath sounds, with no signs of consolidation appreciated upon auscultation in the diffuse bilateral lung fields.  Abdominal:      Tenderness: There is no abdominal tenderness. There is no guarding or rebound.   Musculoskeletal:      Right lower leg: No edema.      Left lower leg: No edema.   Lymphadenopathy:      Cervical: No cervical adenopathy.   Skin:     General: Skin is warm and dry.      Coloration: Skin is not jaundiced or pale.   Neurological:      Mental Status: He is alert. Mental status is at baseline.      Gait: Gait normal.   Psychiatric:         Mood and Affect: Mood normal.         Behavior: Behavior normal. Behavior is cooperative.         Assessment:       1. Pansinusitis, unspecified chronicity    2. Cough, unspecified type    3. Essential hypertension    4. Generalized anxiety disorder           Plan:       Pansinusitis, unspecified chronicity  Patient seen last week and tested negative for influenza A/B and COVID-19.  Completed Medrol Dosepak last  week.  Patient allergic to cephalosporins and tetracyclines -  Start Z-Amalia.  Start Zyrtec 10 mg q.d..  Start p.r.n Tussin-Organidin cough syrup x 5 days for cough suppression.  Continue p.r.n. Tessalon Perles 200mg for cough suppression.  Stop Neti pot usage.  -     azithromycin (Z-AMALIA) 250 MG tablet; Take 2 tablets by mouth on day 1; Take 1 tablet by mouth on days 2-5  Dispense: 6 tablet; Refill: 0  -     cetirizine (ZYRTEC) 10 MG tablet; Take 1 tablet (10 mg total) by mouth once daily.  Dispense: 90 tablet; Refill: 1    Cough, unspecified type  Start p.r.n. Albuterol inhaler for occasional wheezing spells post coughing fits.  -     benzonatate (TESSALON) 200 MG capsule; Take 1 capsule (200 mg total) by mouth 3 (three) times daily as needed for Cough.  Dispense: 30 capsule; Refill: 2  -     albuterol (VENTOLIN HFA) 90 mcg/actuation inhaler; Inhale 2 puffs into the lungs every 6 (six) hours as needed for Wheezing. Rescue  Dispense: 18 g; Refill: 2    Essential hypertension    Generalized anxiety disorder    If no improvement in symptoms, will refer to ENT.    Follow up if symptoms worsen or fail to improve, for Pansinusitis.        12/28/2022 Jemal Bowden PA-C

## 2023-03-08 ENCOUNTER — TELEPHONE (OUTPATIENT)
Dept: FAMILY MEDICINE | Facility: CLINIC | Age: 63
End: 2023-03-08

## 2023-03-13 ENCOUNTER — TELEPHONE (OUTPATIENT)
Dept: FAMILY MEDICINE | Facility: CLINIC | Age: 63
End: 2023-03-13

## 2023-03-13 NOTE — TELEPHONE ENCOUNTER
Spoke to patient that fasting lab and urine are due a week prior to visit. He will come Thursday. Encouraged water.

## 2023-03-17 LAB
ALBUMIN SERPL-MCNC: 4.4 G/DL (ref 3.6–5.1)
ALBUMIN/GLOB SERPL: 2 (CALC) (ref 1–2.5)
ALP SERPL-CCNC: 60 U/L (ref 35–144)
ALT SERPL-CCNC: 21 U/L (ref 9–46)
APPEARANCE UR: CLEAR
AST SERPL-CCNC: 16 U/L (ref 10–35)
BACTERIA #/AREA URNS HPF: ABNORMAL /HPF
BACTERIA UR CULT: ABNORMAL
BASOPHILS # BLD AUTO: 20 CELLS/UL (ref 0–200)
BASOPHILS NFR BLD AUTO: 0.4 %
BILIRUB SERPL-MCNC: 0.8 MG/DL (ref 0.2–1.2)
BILIRUB UR QL STRIP: NEGATIVE
BUN SERPL-MCNC: 19 MG/DL (ref 7–25)
BUN/CREAT SERPL: ABNORMAL (CALC) (ref 6–22)
CALCIUM SERPL-MCNC: 9.4 MG/DL (ref 8.6–10.3)
CHLORIDE SERPL-SCNC: 101 MMOL/L (ref 98–110)
CHOLEST SERPL-MCNC: 175 MG/DL
CHOLEST/HDLC SERPL: 4.4 (CALC)
CO2 SERPL-SCNC: 30 MMOL/L (ref 20–32)
COLOR UR: ABNORMAL
CREAT SERPL-MCNC: 1.26 MG/DL (ref 0.7–1.35)
EGFR: 65 ML/MIN/1.73M2
EOSINOPHIL # BLD AUTO: 168 CELLS/UL (ref 15–500)
EOSINOPHIL NFR BLD AUTO: 3.3 %
ERYTHROCYTE [DISTWIDTH] IN BLOOD BY AUTOMATED COUNT: 13.3 % (ref 11–15)
GLOBULIN SER CALC-MCNC: 2.2 G/DL (CALC) (ref 1.9–3.7)
GLUCOSE SERPL-MCNC: 115 MG/DL (ref 65–99)
GLUCOSE UR QL STRIP: NEGATIVE
HCT VFR BLD AUTO: 41.2 % (ref 38.5–50)
HDLC SERPL-MCNC: 40 MG/DL
HGB BLD-MCNC: 14.9 G/DL (ref 13.2–17.1)
HGB UR QL STRIP: NEGATIVE
HYALINE CASTS #/AREA URNS LPF: ABNORMAL /LPF
KETONES UR QL STRIP: ABNORMAL
LDLC SERPL CALC-MCNC: 110 MG/DL (CALC)
LEUKOCYTE ESTERASE UR QL STRIP: NEGATIVE
LYMPHOCYTES # BLD AUTO: 1030 CELLS/UL (ref 850–3900)
LYMPHOCYTES NFR BLD AUTO: 20.2 %
MCH RBC QN AUTO: 32.5 PG (ref 27–33)
MCHC RBC AUTO-ENTMCNC: 36.2 G/DL (ref 32–36)
MCV RBC AUTO: 89.8 FL (ref 80–100)
MONOCYTES # BLD AUTO: 520 CELLS/UL (ref 200–950)
MONOCYTES NFR BLD AUTO: 10.2 %
NEUTROPHILS # BLD AUTO: 3361 CELLS/UL (ref 1500–7800)
NEUTROPHILS NFR BLD AUTO: 65.9 %
NITRITE UR QL STRIP: NEGATIVE
NONHDLC SERPL-MCNC: 135 MG/DL (CALC)
PH UR STRIP: 7 [PH] (ref 5–8)
PLATELET # BLD AUTO: 218 THOUSAND/UL (ref 140–400)
PMV BLD REES-ECKER: 9.9 FL (ref 7.5–12.5)
POTASSIUM SERPL-SCNC: 4 MMOL/L (ref 3.5–5.3)
PROT SERPL-MCNC: 6.6 G/DL (ref 6.1–8.1)
PROT UR QL STRIP: ABNORMAL
PSA SERPL-MCNC: 0.31 NG/ML
RBC # BLD AUTO: 4.59 MILLION/UL (ref 4.2–5.8)
RBC #/AREA URNS HPF: ABNORMAL /HPF
SERVICE CMNT-IMP: ABNORMAL
SODIUM SERPL-SCNC: 139 MMOL/L (ref 135–146)
SP GR UR STRIP: 1.02 (ref 1–1.03)
SQUAMOUS #/AREA URNS HPF: ABNORMAL /HPF
TRIGL SERPL-MCNC: 141 MG/DL
TSH SERPL-ACNC: 1.87 MIU/L (ref 0.4–4.5)
WBC # BLD AUTO: 5.1 THOUSAND/UL (ref 3.8–10.8)
WBC #/AREA URNS HPF: ABNORMAL /HPF

## 2023-03-20 ENCOUNTER — OFFICE VISIT (OUTPATIENT)
Dept: FAMILY MEDICINE | Facility: CLINIC | Age: 63
End: 2023-03-20
Payer: COMMERCIAL

## 2023-03-20 VITALS
HEIGHT: 73 IN | DIASTOLIC BLOOD PRESSURE: 74 MMHG | SYSTOLIC BLOOD PRESSURE: 126 MMHG | WEIGHT: 250 LBS | HEART RATE: 68 BPM | BODY MASS INDEX: 33.13 KG/M2

## 2023-03-20 DIAGNOSIS — F41.1 GENERALIZED ANXIETY DISORDER: ICD-10-CM

## 2023-03-20 DIAGNOSIS — J32.4 PANSINUSITIS, UNSPECIFIED CHRONICITY: ICD-10-CM

## 2023-03-20 DIAGNOSIS — E78.2 MIXED HYPERLIPIDEMIA: ICD-10-CM

## 2023-03-20 DIAGNOSIS — D68.51 HOMOZYGOUS FACTOR V LEIDEN MUTATION: ICD-10-CM

## 2023-03-20 DIAGNOSIS — K21.9 GASTROESOPHAGEAL REFLUX DISEASE WITHOUT ESOPHAGITIS: ICD-10-CM

## 2023-03-20 DIAGNOSIS — I10 ESSENTIAL HYPERTENSION: Primary | ICD-10-CM

## 2023-03-20 DIAGNOSIS — K58.9 IRRITABLE BOWEL SYNDROME WITHOUT DIARRHEA: ICD-10-CM

## 2023-03-20 DIAGNOSIS — Z12.11 SPECIAL SCREENING FOR MALIGNANT NEOPLASMS, COLON: ICD-10-CM

## 2023-03-20 PROCEDURE — 4010F PR ACE/ARB THEARPY RXD/TAKEN: ICD-10-PCS | Mod: CPTII,S$GLB,, | Performed by: FAMILY MEDICINE

## 2023-03-20 PROCEDURE — 3074F PR MOST RECENT SYSTOLIC BLOOD PRESSURE < 130 MM HG: ICD-10-PCS | Mod: CPTII,S$GLB,, | Performed by: FAMILY MEDICINE

## 2023-03-20 PROCEDURE — 3008F BODY MASS INDEX DOCD: CPT | Mod: CPTII,S$GLB,, | Performed by: FAMILY MEDICINE

## 2023-03-20 PROCEDURE — 3078F PR MOST RECENT DIASTOLIC BLOOD PRESSURE < 80 MM HG: ICD-10-PCS | Mod: CPTII,S$GLB,, | Performed by: FAMILY MEDICINE

## 2023-03-20 PROCEDURE — 3078F DIAST BP <80 MM HG: CPT | Mod: CPTII,S$GLB,, | Performed by: FAMILY MEDICINE

## 2023-03-20 PROCEDURE — 99214 PR OFFICE/OUTPT VISIT, EST, LEVL IV, 30-39 MIN: ICD-10-PCS | Mod: S$GLB,,, | Performed by: FAMILY MEDICINE

## 2023-03-20 PROCEDURE — 3008F PR BODY MASS INDEX (BMI) DOCUMENTED: ICD-10-PCS | Mod: CPTII,S$GLB,, | Performed by: FAMILY MEDICINE

## 2023-03-20 PROCEDURE — 4010F ACE/ARB THERAPY RXD/TAKEN: CPT | Mod: CPTII,S$GLB,, | Performed by: FAMILY MEDICINE

## 2023-03-20 PROCEDURE — 1159F PR MEDICATION LIST DOCUMENTED IN MEDICAL RECORD: ICD-10-PCS | Mod: CPTII,S$GLB,, | Performed by: FAMILY MEDICINE

## 2023-03-20 PROCEDURE — 3074F SYST BP LT 130 MM HG: CPT | Mod: CPTII,S$GLB,, | Performed by: FAMILY MEDICINE

## 2023-03-20 PROCEDURE — 99214 OFFICE O/P EST MOD 30 MIN: CPT | Mod: S$GLB,,, | Performed by: FAMILY MEDICINE

## 2023-03-20 PROCEDURE — 1159F MED LIST DOCD IN RCRD: CPT | Mod: CPTII,S$GLB,, | Performed by: FAMILY MEDICINE

## 2023-03-20 RX ORDER — LOSARTAN POTASSIUM 50 MG/1
50 TABLET ORAL DAILY
Qty: 90 TABLET | Refills: 3 | Status: SHIPPED | OUTPATIENT
Start: 2023-03-20 | End: 2023-09-25 | Stop reason: SDUPTHER

## 2023-03-20 RX ORDER — ATENOLOL 50 MG/1
50 TABLET ORAL 2 TIMES DAILY
Qty: 180 TABLET | Refills: 3 | Status: SHIPPED | OUTPATIENT
Start: 2023-03-20 | End: 2023-09-25 | Stop reason: SDUPTHER

## 2023-03-20 RX ORDER — AMLODIPINE BESYLATE 10 MG/1
10 TABLET ORAL DAILY
Qty: 90 TABLET | Refills: 3 | Status: SHIPPED | OUTPATIENT
Start: 2023-03-20 | End: 2023-09-25 | Stop reason: SDUPTHER

## 2023-03-20 RX ORDER — LORAZEPAM 0.5 MG/1
0.5 TABLET ORAL
Qty: 30 TABLET | Refills: 3 | Status: SHIPPED | OUTPATIENT
Start: 2023-03-20 | End: 2023-09-25 | Stop reason: SDUPTHER

## 2023-03-20 RX ORDER — HYDROCHLOROTHIAZIDE 25 MG/1
25 TABLET ORAL DAILY
Qty: 90 TABLET | Refills: 3 | Status: SHIPPED | OUTPATIENT
Start: 2023-03-20 | End: 2023-09-25 | Stop reason: SDUPTHER

## 2023-03-20 NOTE — MEDICAL/APP STUDENT
Subjective:       Patient ID: Otto Dawson is a 62 y.o. male.    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 He stays active at work.    He inquired about the new bivalent COVID vaccination and Shingles Vaccines, both of which were recommended.     His Allergies are well controlled on cetirizine s/p sinus surgery in 2016.     Recently started on 81 mg ASA daily by Dr. Flanagan due to Factor 5 Leiden mutation (heterozygous). Pt was also advised to have lovenox prior to travel over 2 hr long.     His GERD has become slightly worse recently with increased coughing after meals. Currently on omeprazole x3 per wk.     He is on a large list of herbal supplements noted in the chart which he states have improved his arthritis and skin.     Lab work unremarkable.     Blood pressure well controlled on current regimen. 126/74 today.     Denies CP/SOB, N/V/D, F/C      Review of Systems   Constitutional:  Negative for appetite change, chills, fatigue, fever and unexpected weight change.   HENT:  Negative for nasal congestion, ear pain and trouble swallowing.    Eyes:  Negative for pain, discharge and visual disturbance.   Respiratory:  Negative for apnea, cough, shortness of breath and wheezing.    Cardiovascular:  Negative for chest pain and leg swelling.   Gastrointestinal:  Negative for abdominal pain, blood in stool, constipation, diarrhea, nausea and vomiting.   Endocrine: Negative for cold intolerance, heat intolerance and polydipsia.   Genitourinary:  Negative for dysuria, hematuria, testicular pain and urgency.   Musculoskeletal:  Negative for gait problem, joint swelling  and myalgias.   Neurological:  Negative for dizziness, seizures and numbness.   Psychiatric/Behavioral:  Negative for agitation, behavioral problems and hallucinations. The patient is not nervous/anxious.        Objective:      Physical Exam  Vitals and nursing note reviewed.   Constitutional:       Appearance: He is well-developed.   HENT:      Head: Normocephalic and atraumatic.      Right Ear: External ear normal.      Left Ear: External ear normal.      Nose: Nose normal.   Eyes:      Pupils: Pupils are equal, round, and reactive to light.   Neck:      Thyroid: No thyromegaly.      Vascular: No carotid bruit.   Cardiovascular:      Rate and Rhythm: Normal rate and regular rhythm.      Heart sounds: Normal heart sounds. No murmur heard.  Pulmonary:      Effort: Pulmonary effort is normal.      Breath sounds: Normal breath sounds. No wheezing or rales.   Abdominal:      General: Bowel sounds are normal. There is no distension.      Palpations: Abdomen is soft.      Tenderness: There is no abdominal tenderness.   Musculoskeletal:         General: No tenderness or deformity. Normal range of motion.      Cervical back: Normal range of motion and neck supple.      Lumbar back: Normal. No spasms.      Comments: Bends 90 degrees at  waist. Crepitus to knees bilateral. Good ROM.   Lymphadenopathy:      Cervical: No cervical adenopathy.   Skin:     General: Skin is warm and dry.      Findings: No rash.   Neurological:      Mental Status: He is alert and oriented to person, place, and time.      Cranial Nerves: No cranial nerve deficit.      Coordination: Coordination normal.   Psychiatric:         Behavior: Behavior normal.         Thought Content: Thought content normal.         Judgment: Judgment normal.       Assessment:       Problem List Items Addressed This Visit          Psychiatric    Generalized anxiety disorder    Relevant Medications    LORazepam (ATIVAN) 0.5 MG tablet       Cardiac/Vascular    Essential  hypertension    Relevant Medications    amLODIPine (NORVASC) 10 MG tablet    atenoloL (TENORMIN) 50 MG tablet    hydroCHLOROthiazide (HYDRODIURIL) 25 MG tablet    losartan (COZAAR) 50 MG tablet     Other Visit Diagnoses       Special screening for malignant neoplasms, colon    -  Primary    Relevant Orders    Ambulatory referral/consult to Gastroenterology    Pansinusitis, unspecified chronicity                  Plan:

## 2023-03-22 NOTE — PROGRESS NOTES
SUBJECTIVE:    Patient ID: Otto Dawson is a 62 y.o. male.    Chief Complaint: Follow-up (No bottles, pt has multiple questions ( arthritis, shingles vaccine, pt went to Lg Rader MD at 12/13/2022), review Lab-results,flu vaccine done noelle dixi ,  need refills, abc )    ID: Otto Dawson is a 62 y.o. male.     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 He stays active at work.     He inquired about the new bivalent COVID vaccination and Shingles Vaccines, both of which were recommended.      His Allergies are well controlled on cetirizine s/p sinus surgery in 2016.      Recently started on 81 mg ASA daily by Dr. Flanagan due to Factor 5 Leiden mutation (heterozygous). Pt was also advised to have lovenox prior to travel over 2 hr long.      His GERD has become slightly worse recently with increased coughing after meals. Currently on omeprazole x3 per wk.      He is on a large list of herbal supplements noted in the chart which he states have improved his arthritis and skin.      Lab work unremarkable.      Blood pressure well controlled on current regimen. 126/74 today.      Denies CP/SOB, N/V/D, F/C        Review of Systems   Constitutional:  Negative for appetite change, chills, fatigue, fever and unexpected weight change.   HENT:  Negative for nasal congestion, ear pain and trouble swallowing.    Eyes:  Negative for pain, discharge and visual disturbance.   Respiratory:  Negative for apnea, cough, shortness of breath and wheezing.    Cardiovascular:  Negative for chest pain and leg swelling.   Gastrointestinal:  Negative for abdominal pain, blood  in stool, constipation, diarrhea, nausea and vomiting.   Endocrine: Negative for cold intolerance, heat intolerance and polydipsia.   Genitourinary:  Negative for dysuria, hematuria, testicular pain and urgency.   Musculoskeletal:  Negative for gait problem, joint swelling and myalgias.   Neurological:  Negative for dizziness, seizures and numbness.   Psychiatric/Behavioral:  Negative for agitation, behavioral problems and hallucinations. The patient is not nervous/anxious.          Objective:  Physical Exam  Vitals and nursing note reviewed.   Constitutional:       Appearance: He is well-developed.   HENT:      Head: Normocephalic and atraumatic.      Right Ear: External ear normal.      Left Ear: External ear normal.      Nose: Nose normal.   Eyes:      Pupils: Pupils are equal, round, and reactive to light.   Neck:      Thyroid: No thyromegaly.      Vascular: No carotid bruit.   Cardiovascular:      Rate and Rhythm: Normal rate and regular rhythm.      Heart sounds: Normal heart sounds. No murmur heard.  Pulmonary:      Effort: Pulmonary effort is normal.      Breath sounds: Normal breath sounds. No wheezing or rales.   Abdominal:      General: Bowel sounds are normal. There is no distension.      Palpations: Abdomen is soft.      Tenderness: There is no abdominal tenderness.   Musculoskeletal:         General: No tenderness or deformity. Normal range of motion.      Cervical back: Normal range of motion and neck supple.      Lumbar back: Normal. No spasms.      Comments: Bends 90 degrees at  waist. Crepitus to knees bilateral. Good ROM.   Lymphadenopathy:      Cervical: No cervical adenopathy.   Skin:     General: Skin is warm and dry.      Findings: No rash.   Neurological:      Mental Status: He is alert and oriented to person, place, and time.      Cranial Nerves: No cranial nerve deficit.      Coordination: Coordination normal.   Psychiatric:         Behavior: Behavior normal.         Thought Content:  Thought content normal.         Judgment: Judgment normal.                 Office Visit on 2022   Component Date Value Ref Range Status    Rapid Influenza A Ag 2022 Negative  Negative Final    Rapid Influenza B Ag 2022 Negative  Negative Final     Acceptable 2022 Yes   Final    POC Rapid COVID 2022 Negative  Negative Final     Acceptable 2022 Yes   Final       Past Medical History:   Diagnosis Date    Anxiety     Arthritis     Depression     Factor 5 Leiden mutation, heterozygous     Gastritis 2019    Generalized anxiety disorder     History of nuclear stress test 2014    Hyperlipidemia     Hypertension      Social History     Socioeconomic History    Marital status: Single   Tobacco Use    Smoking status: Former     Types: Cigarettes     Quit date:      Years since quittin.2    Smokeless tobacco: Never   Substance and Sexual Activity    Alcohol use: Yes    Drug use: Never     Past Surgical History:   Procedure Laterality Date    COLONOSCOPY      Dr Don-[facility] 5 yrs     HERNIA REPAIR      SINUS SURGERY  2018    nasal septoplasty/balloon sinuplasty- Dr Roman     Family History   Problem Relation Age of Onset    Heart disease Father        Review of patient's allergies indicates:   Allergen Reactions    Cefuroxime axetil     Ciprofloxacin Other (See Comments)    [place] Itching    Sulfacetamide sodium-sulfur Other (See Comments)    Tetracycline hcl Other (See Comments)       Current Outpatient Medications:     5-hydroxytryptophan, 5-HTP, 50 mg Cap, Take 1 capsule by mouth nightly., Disp: , Rfl:     albuterol (VENTOLIN HFA) 90 mcg/actuation inhaler, Inhale 2 puffs into the lungs every 6 (six) hours as needed for Wheezing. Rescue, Disp: 18 g, Rfl: 2    ascorbic acid, vitamin C, (VITAMIN C) 1000 MG tablet, 1 tablet once daily., Disp: , Rfl:     ascorbic acid, vitamin C, (VITAMIN C) 1000 MG tablet, Take 1 tablet by mouth once daily., Disp: ,  Rfl:     b complex vitamins capsule, Take 1 tablet by mouth once daily., Disp: , Rfl:     CALCIUM-MAGNESIUM ORAL, 1 tablet Daily., Disp: , Rfl:     cetirizine (ZYRTEC) 10 MG tablet, Take 1 tablet (10 mg total) by mouth once daily., Disp: 90 tablet, Rfl: 1    cinnamon bark 500 mg capsule, 1 tablet Daily., Disp: , Rfl:     cinnamon bark 500 mg capsule, Take 1 tablet by mouth once daily., Disp: , Rfl:     clotrimazole-betamethasone 1-0.05% (LOTRISONE) cream, Apply topically 2 (two) times daily., Disp: 30 g, Rfl: 2    co-enzyme Q-10 30 mg capsule, Take 30 mg by mouth 3 (three) times daily., Disp: , Rfl:     cranberry 400 mg Cap, 1 capsule once daily., Disp: , Rfl:     cranberry 400 mg Cap, Take 1 capsule by mouth once daily., Disp: , Rfl:     dicyclomine (BENTYL) 20 mg tablet, Take 1 tablet by mouth as needed., Disp: , Rfl:     flaxseed oiL Oil, 1 capsule by Other route once daily., Disp: , Rfl:     fluticasone propionate (FLONASE) 50 mcg/actuation nasal spray, 1 spray by Each Nostril route 2 (two) times daily., Disp: , Rfl:     garlic 1,000 mg Cap, 1 capsule once daily., Disp: , Rfl:     garlic 1,000 mg Cap, Take 1 capsule by mouth once daily., Disp: , Rfl:     glucosam reed dip-chondroit-C-Mn 081-140-57-3 mg Cap, 1 tablet once daily., Disp: , Rfl:     glucosam reed dip-chondroit-C-Mn 461-588-12-3 mg Cap, Take 1 tablet by mouth once daily., Disp: , Rfl:     hydrocortisone 2.5 % cream, Apply topically once daily., Disp: 15 g, Rfl: 2    magnesium oxide (MAG-OX) 400 mg (241.3 mg magnesium) tablet, Take 400 mg by mouth once daily., Disp: , Rfl:     mv-mins-folic-lycopene-ginkgo 400-300-120 mcg-mcg-mg Tab, 1 tablet once daily., Disp: , Rfl:     PHYTOSTEROL ORAL, 1 tablet once daily., Disp: , Rfl:     PHYTOSTEROL ORAL, Take 1 tablet by mouth once daily., Disp: , Rfl:     turmeric, bulk, 100 % Powd, 1 tablet once daily., Disp: , Rfl:     turmeric, bulk, 100 % Powd, Take 1 tablet by mouth once daily., Disp: , Rfl:     vitamin B  "comp with C no.4 150 mg Tab, 1 tablet once daily., Disp: , Rfl:     vitamin D (VITAMIN D3) 1000 units Tab, Take 1,000 Units by mouth once daily., Disp: , Rfl:     amLODIPine (NORVASC) 10 MG tablet, Take 1 tablet (10 mg total) by mouth once daily., Disp: 90 tablet, Rfl: 3    atenoloL (TENORMIN) 50 MG tablet, Take 1 tablet (50 mg total) by mouth 2 (two) times daily., Disp: 180 tablet, Rfl: 3    enoxaparin (LOVENOX) 30 mg/0.3 mL Syrg, Inject 0.3 mLs (30 mg total) into the skin once daily. During travel days, Disp: 7 each, Rfl: 0    hydroCHLOROthiazide (HYDRODIURIL) 25 MG tablet, Take 1 tablet (25 mg total) by mouth once daily., Disp: 90 tablet, Rfl: 3    LORazepam (ATIVAN) 0.5 MG tablet, Take 1 tablet (0.5 mg total) by mouth as needed., Disp: 30 tablet, Rfl: 3    losartan (COZAAR) 50 MG tablet, Take 1 tablet (50 mg total) by mouth once daily., Disp: 90 tablet, Rfl: 3    Review of Systems       Objective:      Vitals:    03/20/23 1101   BP: 126/74   Pulse: 68   Weight: 113.4 kg (250 lb)   Height: 6' 0.5" (1.842 m)     Physical Exam      Assessment:       1. Essential hypertension    2. Special screening for malignant neoplasms, colon    3. Pansinusitis, unspecified chronicity    4. Generalized anxiety disorder    5. Mixed hyperlipidemia    6. Homozygous Factor V Leiden mutation    7. Gastroesophageal reflux disease without esophagitis    8. Irritable bowel syndrome without diarrhea           Plan:       Essential hypertension    Orders:  -     amLODIPine (NORVASC) 10 MG tablet; Take 1 tablet (10 mg total) by mouth once daily.  Dispense: 90 tablet; Refill: 3  -     atenoloL (TENORMIN) 50 MG tablet; Take 1 tablet (50 mg total) by mouth 2 (two) times daily.  Dispense: 180 tablet; Refill: 3  -     hydroCHLOROthiazide (HYDRODIURIL) 25 MG tablet; Take 1 tablet (25 mg total) by mouth once daily.  Dispense: 90 tablet; Refill: 3  -     losartan (COZAAR) 50 MG tablet; Take 1 tablet (50 mg total) by mouth once daily.  Dispense: 90 " tablet; Refill: 3  Blood pressure well controlled with current regimen  Special screening for malignant neoplasms, colon  -     Ambulatory referral/consult to Gastroenterology; Future; Expected date: 03/27/2023  Refer to Dr. Ruano for colonoscopy  Pansinusitis, unspecified chronicity    Generalized anxiety disorder  -     LORazepam (ATIVAN) 0.5 MG tablet; Take 1 tablet (0.5 mg total) by mouth as needed.  Dispense: 30 tablet; Refill: 3  Continue lorazepam as needed  Mixed hyperlipidemia  Cholesterol 175 HDL 40   excellent lipid panel  Homozygous Factor V Leiden mutation  Use Lovenox injections for long trips /otherwise aspirin 81 mg daily  Gastroesophageal reflux disease without esophagitis    Irritable bowel syndrome without diarrhea  Stable at this time    Follow up in about 6 months (around 9/20/2023), or hld htn.        3/21/2023 [facility] [place]

## 2023-05-22 NOTE — TELEPHONE ENCOUNTER
----- Message from Ela Madden sent at 12/20/2022 11:32 AM CST -----  Had a bad coughing spell a few hours last night,  Had night sweating around 3 .  Is there anything else that will get him over the top?  Shot or inhaler   Pt#[phone].    
Spoke with pt in regards to recent message sent. Verbalized per Jemal that he will send in a Medrol Dosepak to pt's pharmacy. Pt acknowledge understanding.   
Start Medrol Dosepak.  Prescription sent to pharmacy listed.  
Patients creatinine elevated compared to prior readings   Unclear if patients baseline has changed.  Will give short course of IV hydration and revalue it.  If not better imaging of the kidneys.

## 2023-07-17 DIAGNOSIS — J32.4 PANSINUSITIS, UNSPECIFIED CHRONICITY: ICD-10-CM

## 2023-07-17 RX ORDER — CETIRIZINE HYDROCHLORIDE 10 MG/1
10 TABLET ORAL DAILY
Qty: 90 TABLET | Refills: 1 | Status: SHIPPED | OUTPATIENT
Start: 2023-07-17 | End: 2023-09-28 | Stop reason: SDUPTHER

## 2023-07-17 NOTE — TELEPHONE ENCOUNTER
----- Message from Didi Ravi sent at 7/17/2023  2:33 PM CDT -----  Pt needs refill on citrazine 10 mg   Ruben kessler   [phone]

## 2023-08-01 LAB — CRC RECOMMENDATION EXT: NORMAL

## 2023-09-25 ENCOUNTER — OFFICE VISIT (OUTPATIENT)
Dept: FAMILY MEDICINE | Facility: CLINIC | Age: 63
End: 2023-09-25
Attending: FAMILY MEDICINE
Payer: COMMERCIAL

## 2023-09-25 VITALS
BODY MASS INDEX: 33.59 KG/M2 | DIASTOLIC BLOOD PRESSURE: 80 MMHG | HEIGHT: 72 IN | HEART RATE: 74 BPM | SYSTOLIC BLOOD PRESSURE: 138 MMHG | WEIGHT: 248 LBS

## 2023-09-25 DIAGNOSIS — B35.6 TINEA CRURIS: ICD-10-CM

## 2023-09-25 DIAGNOSIS — K58.9 IRRITABLE BOWEL SYNDROME WITHOUT DIARRHEA: ICD-10-CM

## 2023-09-25 DIAGNOSIS — D68.51 HOMOZYGOUS FACTOR V LEIDEN MUTATION: ICD-10-CM

## 2023-09-25 DIAGNOSIS — R05.9 COUGH, UNSPECIFIED TYPE: ICD-10-CM

## 2023-09-25 DIAGNOSIS — Z23 NEED FOR VACCINATION: ICD-10-CM

## 2023-09-25 DIAGNOSIS — E78.2 MIXED HYPERLIPIDEMIA: ICD-10-CM

## 2023-09-25 DIAGNOSIS — K21.9 GASTROESOPHAGEAL REFLUX DISEASE WITHOUT ESOPHAGITIS: ICD-10-CM

## 2023-09-25 DIAGNOSIS — F41.1 GENERALIZED ANXIETY DISORDER: ICD-10-CM

## 2023-09-25 DIAGNOSIS — Z12.5 SPECIAL SCREENING FOR MALIGNANT NEOPLASM OF PROSTATE: ICD-10-CM

## 2023-09-25 DIAGNOSIS — I10 ESSENTIAL HYPERTENSION: Primary | ICD-10-CM

## 2023-09-25 PROCEDURE — 4010F PR ACE/ARB THEARPY RXD/TAKEN: ICD-10-PCS | Mod: CPTII,S$GLB,, | Performed by: FAMILY MEDICINE

## 2023-09-25 PROCEDURE — 3008F PR BODY MASS INDEX (BMI) DOCUMENTED: ICD-10-PCS | Mod: CPTII,S$GLB,, | Performed by: FAMILY MEDICINE

## 2023-09-25 PROCEDURE — 90471 FLU VACCINE (QUAD) GREATER THAN OR EQUAL TO 3YO PRESERVATIVE FREE IM: ICD-10-PCS | Mod: S$GLB,,, | Performed by: FAMILY MEDICINE

## 2023-09-25 PROCEDURE — 3075F SYST BP GE 130 - 139MM HG: CPT | Mod: CPTII,S$GLB,, | Performed by: FAMILY MEDICINE

## 2023-09-25 PROCEDURE — 4010F ACE/ARB THERAPY RXD/TAKEN: CPT | Mod: CPTII,S$GLB,, | Performed by: FAMILY MEDICINE

## 2023-09-25 PROCEDURE — 99214 OFFICE O/P EST MOD 30 MIN: CPT | Mod: 25,S$GLB,, | Performed by: FAMILY MEDICINE

## 2023-09-25 PROCEDURE — 90686 IIV4 VACC NO PRSV 0.5 ML IM: CPT | Mod: S$GLB,,, | Performed by: FAMILY MEDICINE

## 2023-09-25 PROCEDURE — 3079F DIAST BP 80-89 MM HG: CPT | Mod: CPTII,S$GLB,, | Performed by: FAMILY MEDICINE

## 2023-09-25 PROCEDURE — 3008F BODY MASS INDEX DOCD: CPT | Mod: CPTII,S$GLB,, | Performed by: FAMILY MEDICINE

## 2023-09-25 PROCEDURE — 99214 PR OFFICE/OUTPT VISIT, EST, LEVL IV, 30-39 MIN: ICD-10-PCS | Mod: 25,S$GLB,, | Performed by: FAMILY MEDICINE

## 2023-09-25 PROCEDURE — 1159F MED LIST DOCD IN RCRD: CPT | Mod: CPTII,S$GLB,, | Performed by: FAMILY MEDICINE

## 2023-09-25 PROCEDURE — 90471 IMMUNIZATION ADMIN: CPT | Mod: S$GLB,,, | Performed by: FAMILY MEDICINE

## 2023-09-25 PROCEDURE — 90686 FLU VACCINE (QUAD) GREATER THAN OR EQUAL TO 3YO PRESERVATIVE FREE IM: ICD-10-PCS | Mod: S$GLB,,, | Performed by: FAMILY MEDICINE

## 2023-09-25 PROCEDURE — 1159F PR MEDICATION LIST DOCUMENTED IN MEDICAL RECORD: ICD-10-PCS | Mod: CPTII,S$GLB,, | Performed by: FAMILY MEDICINE

## 2023-09-25 PROCEDURE — 3079F PR MOST RECENT DIASTOLIC BLOOD PRESSURE 80-89 MM HG: ICD-10-PCS | Mod: CPTII,S$GLB,, | Performed by: FAMILY MEDICINE

## 2023-09-25 PROCEDURE — 3075F PR MOST RECENT SYSTOLIC BLOOD PRESS GE 130-139MM HG: ICD-10-PCS | Mod: CPTII,S$GLB,, | Performed by: FAMILY MEDICINE

## 2023-09-25 RX ORDER — LORAZEPAM 0.5 MG/1
0.5 TABLET ORAL DAILY PRN
Qty: 30 TABLET | Refills: 3 | OUTPATIENT
Start: 2023-09-25

## 2023-09-25 RX ORDER — ALBUTEROL SULFATE 90 UG/1
2 AEROSOL, METERED RESPIRATORY (INHALATION) EVERY 6 HOURS PRN
Qty: 18 G | Refills: 2 | Status: SHIPPED | OUTPATIENT
Start: 2023-09-25 | End: 2023-09-27

## 2023-09-25 RX ORDER — LORAZEPAM 0.5 MG/1
0.5 TABLET ORAL
Qty: 30 TABLET | Refills: 3 | Status: SHIPPED | OUTPATIENT
Start: 2023-09-25 | End: 2023-09-27 | Stop reason: SDUPTHER

## 2023-09-25 RX ORDER — HYDROCHLOROTHIAZIDE 25 MG/1
25 TABLET ORAL DAILY
Qty: 90 TABLET | Refills: 3 | Status: SHIPPED | OUTPATIENT
Start: 2023-09-25 | End: 2024-04-02 | Stop reason: SDUPTHER

## 2023-09-25 RX ORDER — CLOTRIMAZOLE AND BETAMETHASONE DIPROPIONATE 10; .64 MG/G; MG/G
CREAM TOPICAL 2 TIMES DAILY
Qty: 30 G | Refills: 2 | Status: SHIPPED | OUTPATIENT
Start: 2023-09-25 | End: 2024-04-02 | Stop reason: SDUPTHER

## 2023-09-25 RX ORDER — LOSARTAN POTASSIUM 50 MG/1
50 TABLET ORAL DAILY
Qty: 90 TABLET | Refills: 3 | Status: SHIPPED | OUTPATIENT
Start: 2023-09-25 | End: 2024-04-02 | Stop reason: SDUPTHER

## 2023-09-25 RX ORDER — ATENOLOL 50 MG/1
50 TABLET ORAL 2 TIMES DAILY
Qty: 180 TABLET | Refills: 3 | Status: SHIPPED | OUTPATIENT
Start: 2023-09-25 | End: 2024-04-02 | Stop reason: SDUPTHER

## 2023-09-25 RX ORDER — AMLODIPINE BESYLATE 10 MG/1
10 TABLET ORAL DAILY
Qty: 90 TABLET | Refills: 3 | Status: SHIPPED | OUTPATIENT
Start: 2023-09-25 | End: 2024-04-02 | Stop reason: SDUPTHER

## 2023-09-25 NOTE — PROGRESS NOTES
SUBJECTIVE:    Patient ID: Otto Dawson is a 62 y.o. male.    Chief Complaint: Shoulder Pain (Right shoulder pain on/off, flu vaccine ordered, need refills,abc )    This 62-year-old male here for six-month checkup.  He is having some right shoulder pain intermittently for which he takes ibuprofen.  He still has full range of motion.  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    Denies chest pain shortness of breath cough or fever.    Hypertension-controlled well medications.    Prostate normal with a PSA of 0.31    Cholesterol 175 HDL 40   (6 months ago)        Orders Only on 2023   Component Date Value Ref Range Status    CRC Recommendation External 2023 Repeat colonoscopy in 7 years   Final       Past Medical History:   Diagnosis Date    Anxiety     Arthritis     Depression     Factor 5 Leiden mutation, heterozygous     Gastritis 2019    Generalized anxiety disorder     History of nuclear stress test 2014    Hyperlipidemia     Hypertension      Social History     Socioeconomic History    Marital status: Single   Tobacco Use    Smoking status: Former     Current packs/day: 0.00     Types: Cigarettes     Quit date:      Years since quittin.7    Smokeless tobacco: Never   Substance and Sexual Activity    Alcohol use: Yes    Drug use: Never     Past Surgical History:   Procedure Laterality Date    COLONOSCOPY  2018    Dr Don-rtc 5 yrs     HERNIA REPAIR      SINUS SURGERY  2018    nasal septoplasty/balloon sinuplasty- Dr Roman     Family History   Problem Relation Age of Onset    Heart disease Father        The 10-year CVD risk score (RAVEN'Agoino, et al., 2008) is: 22.3%    Values used to calculate the score:      Age: 62 years      Sex: Male      Diabetic: No      Tobacco smoker: No      Systolic Blood Pressure: 138 mmHg      Is BP treated: Yes      HDL Cholesterol: 40 mg/dL      Total Cholesterol: 175  mg/dL    All of your core healthy metrics are met.      Review of patient's allergies indicates:   Allergen Reactions    Cefuroxime axetil     Ciprofloxacin Other (See Comments)    [place]eno Itching    Sulfacetamide sodium-sulfur Other (See Comments)    Tetracycline hcl Other (See Comments)       Current Outpatient Medications:     5-hydroxytryptophan, 5-HTP, 50 mg Cap, Take 1 capsule by mouth nightly., Disp: , Rfl:     ascorbic acid, vitamin C, (VITAMIN C) 1000 MG tablet, 1 tablet once daily., Disp: , Rfl:     ascorbic acid, vitamin C, (VITAMIN C) 1000 MG tablet, Take 1 tablet by mouth once daily., Disp: , Rfl:     b complex vitamins capsule, Take 1 tablet by mouth once daily., Disp: , Rfl:     CALCIUM-MAGNESIUM ORAL, 1 tablet Daily., Disp: , Rfl:     cetirizine (ZYRTEC) 10 MG tablet, Take 1 tablet (10 mg total) by mouth once daily., Disp: 90 tablet, Rfl: 1    cinnamon bark 500 mg capsule, 1 tablet Daily., Disp: , Rfl:     cinnamon bark 500 mg capsule, Take 1 tablet by mouth once daily., Disp: , Rfl:     co-enzyme Q-10 30 mg capsule, Take 30 mg by mouth 3 (three) times daily., Disp: , Rfl:     cranberry 400 mg Cap, 1 capsule once daily., Disp: , Rfl:     cranberry 400 mg Cap, Take 1 capsule by mouth once daily., Disp: , Rfl:     dicyclomine (BENTYL) 20 mg tablet, Take 1 tablet by mouth as needed., Disp: , Rfl:     enoxaparin (LOVENOX) 30 mg/0.3 mL Syrg, Inject 0.3 mLs (30 mg total) into the skin once daily. During travel days, Disp: 7 each, Rfl: 0    flaxseed oiL Oil, 1 capsule by Other route once daily., Disp: , Rfl:     fluticasone propionate (FLONASE) 50 mcg/actuation nasal spray, 1 spray by Each Nostril route 2 (two) times daily., Disp: , Rfl:     garlic 1,000 mg Cap, 1 capsule once daily., Disp: , Rfl:     garlic 1,000 mg Cap, Take 1 capsule by mouth once daily., Disp: , Rfl:     glucosam reed dip-chondroit-C-Mn 370-013-00-3 mg Cap, 1 tablet once daily., Disp: , Rfl:     glucosam reed dip-chondroit-C-Mn 098-683-95-3 mg  Cap, Take 1 tablet by mouth once daily., Disp: , Rfl:     hydrocortisone 2.5 % cream, Apply topically once daily., Disp: 15 g, Rfl: 2    magnesium oxide (MAG-OX) 400 mg (241.3 mg magnesium) tablet, Take 400 mg by mouth once daily., Disp: , Rfl:     mv-mins-folic-lycopene-ginkgo 400-300-120 mcg-mcg-mg Tab, 1 tablet once daily., Disp: , Rfl:     PHYTOSTEROL ORAL, 1 tablet once daily., Disp: , Rfl:     PHYTOSTEROL ORAL, Take 1 tablet by mouth once daily., Disp: , Rfl:     turmeric, bulk, 100 % Powd, 1 tablet once daily., Disp: , Rfl:     turmeric, bulk, 100 % Powd, Take 1 tablet by mouth once daily., Disp: , Rfl:     vitamin B comp with C no.4 150 mg Tab, 1 tablet once daily., Disp: , Rfl:     vitamin D (VITAMIN D3) 1000 units Tab, Take 1,000 Units by mouth once daily., Disp: , Rfl:     albuterol (VENTOLIN HFA) 90 mcg/actuation inhaler, Inhale 2 puffs into the lungs every 6 (six) hours as needed for Wheezing. Rescue, Disp: 18 g, Rfl: 2    amLODIPine (NORVASC) 10 MG tablet, Take 1 tablet (10 mg total) by mouth once daily., Disp: 90 tablet, Rfl: 3    atenoloL (TENORMIN) 50 MG tablet, Take 1 tablet (50 mg total) by mouth 2 (two) times daily., Disp: 180 tablet, Rfl: 3    clotrimazole-betamethasone 1-0.05% (LOTRISONE) cream, Apply topically 2 (two) times daily., Disp: 30 g, Rfl: 2    hydroCHLOROthiazide (HYDRODIURIL) 25 MG tablet, Take 1 tablet (25 mg total) by mouth once daily., Disp: 90 tablet, Rfl: 3    LORazepam (ATIVAN) 0.5 MG tablet, Take 1 tablet (0.5 mg total) by mouth as needed., Disp: 30 tablet, Rfl: 3    losartan (COZAAR) 50 MG tablet, Take 1 tablet (50 mg total) by mouth once daily., Disp: 90 tablet, Rfl: 3    Review of Systems   Constitutional:  Negative for appetite change, chills, fatigue, fever and unexpected weight change.   HENT:  Negative for ear pain and trouble swallowing.    Eyes:  Negative for pain, discharge and visual disturbance.   Respiratory:  Negative for apnea, cough, shortness of breath and  wheezing.    Cardiovascular:  Negative for chest pain and leg swelling.   Gastrointestinal:  Negative for abdominal pain, blood in stool, constipation, diarrhea, nausea, vomiting and reflux.   Endocrine: Negative for cold intolerance, heat intolerance and polydipsia.   Genitourinary:  Negative for bladder incontinence, dysuria, erectile dysfunction, frequency, hematuria, testicular pain and urgency.   Musculoskeletal:  Positive for arthralgias (Right shoulder discomfort). Negative for gait problem, joint swelling and myalgias.   Neurological:  Negative for dizziness, seizures and numbness.   Psychiatric/Behavioral:  Negative for agitation, behavioral problems and hallucinations. The patient is not nervous/anxious.            Objective:      Vitals:    09/25/23 1109   BP: 138/80   Pulse: 74   Weight: 112.5 kg (248 lb)   Height: 6' (1.829 m)     Physical Exam  Vitals and nursing note reviewed.   Constitutional:       General: He is not in acute distress.     Appearance: He is well-developed. He is obese. He is not toxic-appearing.   HENT:      Head: Normocephalic and atraumatic.      Right Ear: Tympanic membrane and external ear normal.      Left Ear: Tympanic membrane and external ear normal.      Nose: Nose normal.      Mouth/Throat:      Pharynx: Oropharynx is clear.   Eyes:      Pupils: Pupils are equal, round, and reactive to light.   Neck:      Thyroid: No thyromegaly.      Vascular: No carotid bruit.   Cardiovascular:      Rate and Rhythm: Normal rate and regular rhythm.      Heart sounds: Normal heart sounds. No murmur heard.  Pulmonary:      Effort: Pulmonary effort is normal.      Breath sounds: Normal breath sounds. No wheezing or rales.   Abdominal:      General: Bowel sounds are normal. There is no distension.      Palpations: Abdomen is soft.      Tenderness: There is no abdominal tenderness.   Musculoskeletal:         General: No tenderness or deformity. Normal range of motion.      Cervical back:  Normal range of motion and neck supple.      Lumbar back: Normal. No spasms.      Comments: Bends 90 degrees at  waist-full range of motion both shoulders, knees have no crepitance.  No pitting edema to lower extremities   Lymphadenopathy:      Cervical: No cervical adenopathy.   Skin:     General: Skin is warm and dry.      Findings: No rash.   Neurological:      Mental Status: He is alert and oriented to person, place, and time. Mental status is at baseline.      Cranial Nerves: No cranial nerve deficit.      Coordination: Coordination normal.   Psychiatric:         Mood and Affect: Mood normal.         Behavior: Behavior normal.         Thought Content: Thought content normal.         Judgment: Judgment normal.           Assessment:       1. Essential hypertension    2. Need for vaccination    3. Generalized anxiety disorder    4. Tinea cruris    5. Cough, unspecified type    6. Mixed hyperlipidemia    7. Homozygous Factor V Leiden mutation    8. Gastroesophageal reflux disease without esophagitis    9. Irritable bowel syndrome without diarrhea         Plan:       Essential hypertension  Comments:  Well managed.  Continue current medications  Orders:  -     amLODIPine (NORVASC) 10 MG tablet; Take 1 tablet (10 mg total) by mouth once daily.  Dispense: 90 tablet; Refill: 3  -     atenoloL (TENORMIN) 50 MG tablet; Take 1 tablet (50 mg total) by mouth 2 (two) times daily.  Dispense: 180 tablet; Refill: 3  -     hydroCHLOROthiazide (HYDRODIURIL) 25 MG tablet; Take 1 tablet (25 mg total) by mouth once daily.  Dispense: 90 tablet; Refill: 3  -     losartan (COZAAR) 50 MG tablet; Take 1 tablet (50 mg total) by mouth once daily.  Dispense: 90 tablet; Refill: 3    Need for vaccination  -     Influenza - Quadrivalent *Preferred* (6 months+) (PF)  Flu vaccine today  Generalized anxiety disorder  -     LORazepam (ATIVAN) 0.5 MG tablet; Take 1 tablet (0.5 mg total) by mouth as needed.  Dispense: 30 tablet; Refill: 3  Refill  lorazepam for p.r.n. use  Tinea cruris  -     clotrimazole-betamethasone 1-0.05% (LOTRISONE) cream; Apply topically 2 (two) times daily.  Dispense: 30 g; Refill: 2    Cough, unspecified type  -     albuterol (VENTOLIN HFA) 90 mcg/actuation inhaler; Inhale 2 puffs into the lungs every 6 (six) hours as needed for Wheezing. Rescue  Dispense: 18 g; Refill: 2    Mixed hyperlipidemia  Will repeat labs in 6 months  Homozygous Factor V Leiden mutation  No blood clots  Gastroesophageal reflux disease without esophagitis    Irritable bowel syndrome without diarrhea  Stable at this time  Shingrix vaccine recommended at local pharmacy  Follow up in about 6 months (around 3/25/2024), or htn.        9/25/2023 Thompson Norman

## 2023-09-25 NOTE — TELEPHONE ENCOUNTER
----- Message from Didi Ravi sent at 9/25/2023  2:43 PM CDT -----  Radha with [facility] is calling and she needs clarification for albuterol. It is not covered and would like to know if they can do pro air. The lorazepam needs more specific directions   [phone]

## 2023-09-27 DIAGNOSIS — F41.1 GENERALIZED ANXIETY DISORDER: ICD-10-CM

## 2023-09-27 RX ORDER — ALBUTEROL SULFATE 90 UG/1
2 AEROSOL, METERED RESPIRATORY (INHALATION) EVERY 6 HOURS PRN
Qty: 18 G | Refills: 0 | Status: SHIPPED | OUTPATIENT
Start: 2023-09-27 | End: 2024-09-26

## 2023-09-27 RX ORDER — LORAZEPAM 0.5 MG/1
0.5 TABLET ORAL EVERY 12 HOURS PRN
Qty: 30 TABLET | Refills: 3 | Status: SHIPPED | OUTPATIENT
Start: 2023-09-27 | End: 2024-04-02 | Stop reason: SDUPTHER

## 2023-09-27 NOTE — TELEPHONE ENCOUNTER
"Okay per Dr. Norman to "change to pro air hfa. Lorazepam 0.5mg 1 tab po q 12 hr prn anxiety #30 3 refills."   "

## 2023-09-27 NOTE — TELEPHONE ENCOUNTER
----- Message from Margarette Alford sent at 9/27/2023  8:35 AM CDT -----  Apurva with [facility] pharmacy called stated that she need to speak to the nurse to clarify the prescription lorazepam she need directions for this one and the patient ventolin HFA  is not covered but the pro air is covered and she would like to know if it can be changed. Please give her a call at [phone]

## 2023-09-28 DIAGNOSIS — J32.4 PANSINUSITIS, UNSPECIFIED CHRONICITY: ICD-10-CM

## 2023-09-28 RX ORDER — CETIRIZINE HYDROCHLORIDE 10 MG/1
10 TABLET ORAL DAILY
Qty: 90 TABLET | Refills: 3 | Status: SHIPPED | OUTPATIENT
Start: 2023-09-28 | End: 2024-04-02 | Stop reason: SDUPTHER

## 2023-09-28 NOTE — TELEPHONE ENCOUNTER
Spoke with patient, let him know the ativan was fixed yesterday for him. He would like the ceterizine sent also. Set up

## 2023-12-13 ENCOUNTER — OFFICE VISIT (OUTPATIENT)
Dept: HEMATOLOGY/ONCOLOGY | Facility: CLINIC | Age: 63
End: 2023-12-13
Payer: COMMERCIAL

## 2023-12-13 VITALS
BODY MASS INDEX: 34 KG/M2 | TEMPERATURE: 98 F | HEIGHT: 72 IN | RESPIRATION RATE: 18 BRPM | DIASTOLIC BLOOD PRESSURE: 83 MMHG | WEIGHT: 251 LBS | SYSTOLIC BLOOD PRESSURE: 152 MMHG | HEART RATE: 64 BPM

## 2023-12-13 DIAGNOSIS — D68.51 HOMOZYGOUS FACTOR V LEIDEN MUTATION: Primary | ICD-10-CM

## 2023-12-13 PROCEDURE — 3077F SYST BP >= 140 MM HG: CPT | Mod: CPTII,S$GLB,, | Performed by: INTERNAL MEDICINE

## 2023-12-13 PROCEDURE — 99213 PR OFFICE/OUTPT VISIT, EST, LEVL III, 20-29 MIN: ICD-10-PCS | Mod: S$GLB,,, | Performed by: INTERNAL MEDICINE

## 2023-12-13 PROCEDURE — 99213 OFFICE O/P EST LOW 20 MIN: CPT | Mod: S$GLB,,, | Performed by: INTERNAL MEDICINE

## 2023-12-13 PROCEDURE — 3008F BODY MASS INDEX DOCD: CPT | Mod: CPTII,S$GLB,, | Performed by: INTERNAL MEDICINE

## 2023-12-13 PROCEDURE — 3079F PR MOST RECENT DIASTOLIC BLOOD PRESSURE 80-89 MM HG: ICD-10-PCS | Mod: CPTII,S$GLB,, | Performed by: INTERNAL MEDICINE

## 2023-12-13 PROCEDURE — 4010F PR ACE/ARB THEARPY RXD/TAKEN: ICD-10-PCS | Mod: CPTII,S$GLB,, | Performed by: INTERNAL MEDICINE

## 2023-12-13 PROCEDURE — 1160F RVW MEDS BY RX/DR IN RCRD: CPT | Mod: CPTII,S$GLB,, | Performed by: INTERNAL MEDICINE

## 2023-12-13 PROCEDURE — 3077F PR MOST RECENT SYSTOLIC BLOOD PRESSURE >= 140 MM HG: ICD-10-PCS | Mod: CPTII,S$GLB,, | Performed by: INTERNAL MEDICINE

## 2023-12-13 PROCEDURE — 1159F MED LIST DOCD IN RCRD: CPT | Mod: CPTII,S$GLB,, | Performed by: INTERNAL MEDICINE

## 2023-12-13 PROCEDURE — 1159F PR MEDICATION LIST DOCUMENTED IN MEDICAL RECORD: ICD-10-PCS | Mod: CPTII,S$GLB,, | Performed by: INTERNAL MEDICINE

## 2023-12-13 PROCEDURE — 3079F DIAST BP 80-89 MM HG: CPT | Mod: CPTII,S$GLB,, | Performed by: INTERNAL MEDICINE

## 2023-12-13 PROCEDURE — 1160F PR REVIEW ALL MEDS BY PRESCRIBER/CLIN PHARMACIST DOCUMENTED: ICD-10-PCS | Mod: CPTII,S$GLB,, | Performed by: INTERNAL MEDICINE

## 2023-12-13 PROCEDURE — 3008F PR BODY MASS INDEX (BMI) DOCUMENTED: ICD-10-PCS | Mod: CPTII,S$GLB,, | Performed by: INTERNAL MEDICINE

## 2023-12-13 PROCEDURE — 4010F ACE/ARB THERAPY RXD/TAKEN: CPT | Mod: CPTII,S$GLB,, | Performed by: INTERNAL MEDICINE

## 2023-12-13 NOTE — PROGRESS NOTES
PROGRESS NOTE    Subjective:       Patient ID: Otto Dawson is a 63 y.o. male.    Chief Complaint:  No chief complaint on file.  Factor V Leiden follow up    History of Present Illness:   Otto Dawson is a 63 y.o. male who presents for follow up of above.     No new complaints at this time.      PMH:  Mr. Dawson is a 61yo male who is referred due to a family history of factor V leiden.  He was tested on 10/20/2016 as a homozygous carrier.  He has not had a blood clot in his life time.  He notes several surgeries with no problems but has had lovenox prophylaxis for these procedures.      He has an aunt who is homozygous who did have approximately 3 clots throughout her life and is  but not from a clotting issue.  He also has another sister who is homozygous and had approx 3 blood clots.  He has 2 other family members who are heterozygous and have not had blood clotting issues.      Family and Social history reviewed and is unchanged from 2023             Current Outpatient Medications:     5-hydroxytryptophan, 5-HTP, 50 mg Cap, Take 1 capsule by mouth nightly., Disp: , Rfl:     albuterol (PROAIR HFA) 90 mcg/actuation inhaler, Inhale 2 puffs into the lungs every 6 (six) hours as needed for Wheezing. Rescue, Disp: 18 g, Rfl: 0    amLODIPine (NORVASC) 10 MG tablet, Take 1 tablet (10 mg total) by mouth once daily., Disp: 90 tablet, Rfl: 3    ascorbic acid, vitamin C, (VITAMIN C) 1000 MG tablet, 1 tablet once daily., Disp: , Rfl:     ascorbic acid, vitamin C, (VITAMIN C) 1000 MG tablet, Take 1 tablet by mouth once daily., Disp: , Rfl:     atenoloL (TENORMIN) 50 MG tablet, Take 1 tablet (50 mg total) by mouth 2 (two) times daily., Disp: 180 tablet, Rfl: 3    b complex vitamins capsule, Take 1 tablet by mouth once daily., Disp: , Rfl:     CALCIUM-MAGNESIUM ORAL, 1 tablet Daily., Disp: , Rfl:     cetirizine (ZYRTEC) 10 MG tablet, Take 1  tablet (10 mg total) by mouth once daily., Disp: 90 tablet, Rfl: 3    cinnamon bark 500 mg capsule, 1 tablet Daily., Disp: , Rfl:     cinnamon bark 500 mg capsule, Take 1 tablet by mouth once daily., Disp: , Rfl:     clotrimazole-betamethasone 1-0.05% (LOTRISONE) cream, Apply topically 2 (two) times daily., Disp: 30 g, Rfl: 2    co-enzyme Q-10 30 mg capsule, Take 30 mg by mouth 3 (three) times daily., Disp: , Rfl:     cranberry 400 mg Cap, 1 capsule once daily., Disp: , Rfl:     cranberry 400 mg Cap, Take 1 capsule by mouth once daily., Disp: , Rfl:     dicyclomine (BENTYL) 20 mg tablet, Take 1 tablet by mouth as needed., Disp: , Rfl:     enoxaparin (LOVENOX) 30 mg/0.3 mL Syrg, Inject 0.3 mLs (30 mg total) into the skin once daily. During travel days, Disp: 7 each, Rfl: 0    flaxseed oiL Oil, 1 capsule by Other route once daily., Disp: , Rfl:     fluticasone propionate (FLONASE) 50 mcg/actuation nasal spray, 1 spray by Each Nostril route 2 (two) times daily., Disp: , Rfl:     garlic 1,000 mg Cap, 1 capsule once daily., Disp: , Rfl:     garlic 1,000 mg Cap, Take 1 capsule by mouth once daily., Disp: , Rfl:     glucosam reed dip-chondroit-C-Mn 947-310-16-3 mg Cap, 1 tablet once daily., Disp: , Rfl:     glucosam reed dip-chondroit-C-Mn 774-621-74-3 mg Cap, Take 1 tablet by mouth once daily., Disp: , Rfl:     hydroCHLOROthiazide (HYDRODIURIL) 25 MG tablet, Take 1 tablet (25 mg total) by mouth once daily., Disp: 90 tablet, Rfl: 3    hydrocortisone 2.5 % cream, Apply topically once daily., Disp: 15 g, Rfl: 2    LORazepam (ATIVAN) 0.5 MG tablet, Take 1 tablet (0.5 mg total) by mouth every 12 (twelve) hours as needed for Anxiety., Disp: 30 tablet, Rfl: 3    losartan (COZAAR) 50 MG tablet, Take 1 tablet (50 mg total) by mouth once daily., Disp: 90 tablet, Rfl: 3    magnesium oxide (MAG-OX) 400 mg (241.3 mg magnesium) tablet, Take 400 mg by mouth once daily., Disp: , Rfl:     mv-mins-folic-lycopene-ginkgo 400-300-120 mcg-mcg-mg  Tab, 1 tablet once daily., Disp: , Rfl:     PHYTOSTEROL ORAL, 1 tablet once daily., Disp: , Rfl:     PHYTOSTEROL ORAL, Take 1 tablet by mouth once daily., Disp: , Rfl:     turmeric, bulk, 100 % Powd, 1 tablet once daily., Disp: , Rfl:     turmeric, bulk, 100 % Powd, Take 1 tablet by mouth once daily., Disp: , Rfl:     vitamin B comp with C no.4 150 mg Tab, 1 tablet once daily., Disp: , Rfl:     vitamin D (VITAMIN D3) 1000 units Tab, Take 1,000 Units by mouth once daily., Disp: , Rfl:         Objective:       Physical Examination:     BP (!) 152/83   Pulse 64   Temp 98.1 °F (36.7 °C)   Resp 18   Ht 6' (1.829 m)   Wt 113.9 kg (251 lb)   BMI 34.04 kg/m²     Physical Exam  Constitutional:       Appearance: Normal appearance.   HENT:      Head: Normocephalic and atraumatic.   Eyes:      General: No scleral icterus.     Conjunctiva/sclera: Conjunctivae normal.   Cardiovascular:      Rate and Rhythm: Normal rate.   Pulmonary:      Effort: Pulmonary effort is normal.   Abdominal:      General: Abdomen is flat.   Neurological:      General: No focal deficit present.      Mental Status: He is alert and oriented to person, place, and time.   Psychiatric:         Mood and Affect: Mood normal.         Behavior: Behavior normal.         Labs:   No results found for this or any previous visit (from the past 336 hour(s)).  CMP  Sodium   Date Value Ref Range Status   03/16/2023 139 135 - 146 mmol/L Final     Potassium   Date Value Ref Range Status   03/16/2023 4.0 3.5 - 5.3 mmol/L Final     Chloride   Date Value Ref Range Status   03/16/2023 101 98 - 110 mmol/L Final     CO2   Date Value Ref Range Status   03/16/2023 30 20 - 32 mmol/L Final     Glucose   Date Value Ref Range Status   03/16/2023 115 (H) 65 - 99 mg/dL Final     Comment:                   Fasting reference interval     For someone without known diabetes, a glucose value  between 100 and 125 mg/dL is consistent with  prediabetes and should be confirmed with  "a  follow-up test.          BUN   Date Value Ref Range Status   03/16/2023 19 7 - 25 mg/dL Final     Creatinine   Date Value Ref Range Status   03/16/2023 1.26 0.70 - 1.35 mg/dL Final     Calcium   Date Value Ref Range Status   03/16/2023 9.4 8.6 - 10.3 mg/dL Final     Total Protein   Date Value Ref Range Status   03/16/2023 6.6 6.1 - 8.1 g/dL Final     Albumin   Date Value Ref Range Status   03/16/2023 4.4 3.6 - 5.1 g/dL Final     Total Bilirubin   Date Value Ref Range Status   03/16/2023 0.8 0.2 - 1.2 mg/dL Final     AST   Date Value Ref Range Status   03/16/2023 16 10 - 35 U/L Final     ALT   Date Value Ref Range Status   03/16/2023 21 9 - 46 U/L Final     eGFR if    Date Value Ref Range Status   03/11/2022 64 > OR = 60 mL/min/1.73m2 Final     eGFR if non    Date Value Ref Range Status   03/11/2022 56 (L) > OR = 60 mL/min/1.73m2 Final     No results found for: "CEA"  No results found for: "PSA"        Assessment/Plan:     Problem List Items Addressed This Visit       Homozygous Factor V Leiden mutation - Primary     Patient continues on ASA and has not developed a clot at this time.  Will continue to offer prophylaxis in high risk situations and continue yearly follow up.              Discussion:     Follow up in about 6 months (around 6/13/2024).      Electronically signed by Lg Costa        "

## 2023-12-13 NOTE — ASSESSMENT & PLAN NOTE
Patient continues on ASA and has not developed a clot at this time.  Will continue to offer prophylaxis in high risk situations and continue yearly follow up.

## 2024-03-15 ENCOUNTER — TELEPHONE (OUTPATIENT)
Dept: FAMILY MEDICINE | Facility: CLINIC | Age: 64
End: 2024-03-15
Payer: COMMERCIAL

## 2024-03-15 NOTE — TELEPHONE ENCOUNTER
----- Message from Didi Ravi sent at 3/15/2024  9:41 AM CDT -----  Pt would like clarification on portal message that he is unable to see.   [phone]

## 2024-03-20 ENCOUNTER — TELEPHONE (OUTPATIENT)
Dept: FAMILY MEDICINE | Facility: CLINIC | Age: 64
End: 2024-03-20
Payer: COMMERCIAL

## 2024-04-02 ENCOUNTER — OFFICE VISIT (OUTPATIENT)
Dept: FAMILY MEDICINE | Facility: CLINIC | Age: 64
End: 2024-04-02
Attending: FAMILY MEDICINE
Payer: COMMERCIAL

## 2024-04-02 VITALS
OXYGEN SATURATION: 97 % | HEIGHT: 72 IN | WEIGHT: 246 LBS | BODY MASS INDEX: 33.32 KG/M2 | DIASTOLIC BLOOD PRESSURE: 70 MMHG | SYSTOLIC BLOOD PRESSURE: 122 MMHG | HEART RATE: 74 BPM

## 2024-04-02 DIAGNOSIS — M23.91 INTERNAL DERANGEMENT OF RIGHT KNEE: ICD-10-CM

## 2024-04-02 DIAGNOSIS — B35.6 TINEA CRURIS: ICD-10-CM

## 2024-04-02 DIAGNOSIS — K21.9 GASTROESOPHAGEAL REFLUX DISEASE WITHOUT ESOPHAGITIS: ICD-10-CM

## 2024-04-02 DIAGNOSIS — L71.9 ROSACEA: ICD-10-CM

## 2024-04-02 DIAGNOSIS — J32.4 PANSINUSITIS, UNSPECIFIED CHRONICITY: ICD-10-CM

## 2024-04-02 DIAGNOSIS — D68.51 HOMOZYGOUS FACTOR V LEIDEN MUTATION: ICD-10-CM

## 2024-04-02 DIAGNOSIS — E78.2 MIXED HYPERLIPIDEMIA: ICD-10-CM

## 2024-04-02 DIAGNOSIS — I10 ESSENTIAL HYPERTENSION: ICD-10-CM

## 2024-04-02 DIAGNOSIS — Z00.00 WELLNESS EXAMINATION: Primary | ICD-10-CM

## 2024-04-02 DIAGNOSIS — F41.1 GENERALIZED ANXIETY DISORDER: ICD-10-CM

## 2024-04-02 DIAGNOSIS — K58.9 IRRITABLE BOWEL SYNDROME WITHOUT DIARRHEA: ICD-10-CM

## 2024-04-02 PROCEDURE — 1159F MED LIST DOCD IN RCRD: CPT | Mod: CPTII,S$GLB,, | Performed by: FAMILY MEDICINE

## 2024-04-02 PROCEDURE — 3078F DIAST BP <80 MM HG: CPT | Mod: CPTII,S$GLB,, | Performed by: FAMILY MEDICINE

## 2024-04-02 PROCEDURE — 3074F SYST BP LT 130 MM HG: CPT | Mod: CPTII,S$GLB,, | Performed by: FAMILY MEDICINE

## 2024-04-02 PROCEDURE — 4010F ACE/ARB THERAPY RXD/TAKEN: CPT | Mod: CPTII,S$GLB,, | Performed by: FAMILY MEDICINE

## 2024-04-02 PROCEDURE — 3008F BODY MASS INDEX DOCD: CPT | Mod: CPTII,S$GLB,, | Performed by: FAMILY MEDICINE

## 2024-04-02 PROCEDURE — 99396 PREV VISIT EST AGE 40-64: CPT | Mod: S$GLB,,, | Performed by: FAMILY MEDICINE

## 2024-04-02 RX ORDER — CLOTRIMAZOLE AND BETAMETHASONE DIPROPIONATE 10; .64 MG/G; MG/G
CREAM TOPICAL 2 TIMES DAILY
Qty: 30 G | Refills: 2 | Status: SHIPPED | OUTPATIENT
Start: 2024-04-02

## 2024-04-02 RX ORDER — ATENOLOL 50 MG/1
50 TABLET ORAL 2 TIMES DAILY
Qty: 180 TABLET | Refills: 3 | Status: SHIPPED | OUTPATIENT
Start: 2024-04-02

## 2024-04-02 RX ORDER — AMLODIPINE BESYLATE 10 MG/1
10 TABLET ORAL DAILY
Qty: 90 TABLET | Refills: 3 | Status: SHIPPED | OUTPATIENT
Start: 2024-04-02

## 2024-04-02 RX ORDER — LORAZEPAM 0.5 MG/1
0.5 TABLET ORAL EVERY 12 HOURS PRN
Qty: 30 TABLET | Refills: 3 | Status: SHIPPED | OUTPATIENT
Start: 2024-04-02

## 2024-04-02 RX ORDER — DICYCLOMINE HYDROCHLORIDE 20 MG/1
20 TABLET ORAL 4 TIMES DAILY
Qty: 50 TABLET | Refills: 2 | Status: SHIPPED | OUTPATIENT
Start: 2024-04-02

## 2024-04-02 RX ORDER — CETIRIZINE HYDROCHLORIDE 10 MG/1
10 TABLET ORAL DAILY
Qty: 90 TABLET | Refills: 3 | Status: SHIPPED | OUTPATIENT
Start: 2024-04-02 | End: 2025-04-02

## 2024-04-02 RX ORDER — HYDROCHLOROTHIAZIDE 25 MG/1
25 TABLET ORAL DAILY
Qty: 90 TABLET | Refills: 3 | Status: SHIPPED | OUTPATIENT
Start: 2024-04-02

## 2024-04-02 RX ORDER — LOSARTAN POTASSIUM 50 MG/1
50 TABLET ORAL DAILY
Qty: 90 TABLET | Refills: 3 | Status: SHIPPED | OUTPATIENT
Start: 2024-04-02

## 2024-04-02 NOTE — PROGRESS NOTES
SUBJECTIVE:    Patient ID: Otto Dawson is a 63 y.o. male.    Chief Complaint: Follow-up (6 mo follow up/ lab work in epic/ med refills/ right knee injury while working and twisting body and not moving feet, knee got swollen//mp)    63-year-old male here for his wellness physical.  He is lost 5 lb since our last visit.  He has been working hard at back on his sugar intake, less inflammatory type foods.  He is trying essential oils and taking multiple online supplements.  He is also tried intermittent fasting and eating only between 12:30 p.m. and 8:00 p.m..  He also has a healthy smoothie every morning.    L-theonine-supplement to improve sleep, he is now dreaming better.    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    Osteoarthritis in his fingers, much less joint inflammation after taking OTC supplements serareptan and natokenase    IBS-flares up wants to have a refill on dicyclomine.    Hypertension-well controlled with medications    Hyperlipidemia, diet-controlled, not on a statin.    Developed right knee pain and swelling one month ago after doing twisting type work at the [employer], treating it conservatively with supplements    Follow-up  Associated symptoms include arthralgias.       No visits with results within 6 Month(s) from this visit.   Latest known visit with results is:   Office Visit on 09/25/2023   Component Date Value Ref Range Status    WBC 03/29/2024 4.7  3.8 - 10.8 Thousand/uL Final    RBC 03/29/2024 4.76  4.20 - 5.80 Million/uL Final    Hemoglobin 03/29/2024 14.6  13.2 - 17.1 g/dL Final    Hematocrit 03/29/2024 42.4  38.5 - 50.0 % Final    MCV 03/29/2024 89.1  80.0 - 100.0 fL Final    MCH 03/29/2024 30.7  27.0 - 33.0 pg Final    MCHC 03/29/2024 34.4  32.0 - 36.0 g/dL Final    RDW 03/29/2024 12.5  11.0 - 15.0 % Final    Platelets 03/29/2024 227  140 - 400 Thousand/uL Final    MPV  03/29/2024 10.6  7.5 - 12.5 fL Final    Neutrophils, Abs 03/29/2024 2,707  1,500 - 7,800 cells/uL Final    Lymph # 03/29/2024 1,147  850 - 3,900 cells/uL Final    Mono # 03/29/2024 555  200 - 950 cells/uL Final    Eos # 03/29/2024 249  15 - 500 cells/uL Final    Baso # 03/29/2024 42  0 - 200 cells/uL Final    Neutrophils Relative 03/29/2024 57.6  % Final    Lymph % 03/29/2024 24.4  % Final    Mono % 03/29/2024 11.8  % Final    Eosinophil % 03/29/2024 5.3  % Final    Basophil % 03/29/2024 0.9  % Final    Glucose 03/29/2024 106 (H)  65 - 99 mg/dL Final    BUN 03/29/2024 25  7 - 25 mg/dL Final    Creatinine 03/29/2024 1.31  0.70 - 1.35 mg/dL Final    eGFR 03/29/2024 62  > OR = 60 mL/min/1.73m2 Final    BUN/Creatinine Ratio 03/29/2024 SEE NOTE:  6 - 22 (calc) Final    Sodium 03/29/2024 138  135 - 146 mmol/L Final    Potassium 03/29/2024 4.0  3.5 - 5.3 mmol/L Final    Chloride 03/29/2024 102  98 - 110 mmol/L Final    CO2 03/29/2024 24  20 - 32 mmol/L Final    Calcium 03/29/2024 9.8  8.6 - 10.3 mg/dL Final    Total Protein 03/29/2024 7.0  6.1 - 8.1 g/dL Final    Albumin 03/29/2024 4.6  3.6 - 5.1 g/dL Final    Globulin, Total 03/29/2024 2.4  1.9 - 3.7 g/dL (calc) Final    Albumin/Globulin Ratio 03/29/2024 1.9  1.0 - 2.5 (calc) Final    Total Bilirubin 03/29/2024 1.0  0.2 - 1.2 mg/dL Final    Alkaline Phosphatase 03/29/2024 69  35 - 144 U/L Final    AST 03/29/2024 22  10 - 35 U/L Final    ALT 03/29/2024 21  9 - 46 U/L Final    Cholesterol 03/29/2024 182  <200 mg/dL Final    HDL 03/29/2024 38 (L)  > OR = 40 mg/dL Final    Triglycerides 03/29/2024 128  <150 mg/dL Final    LDL Cholesterol 03/29/2024 120 (H)  mg/dL (calc) Final    HDL/Cholesterol Ratio 03/29/2024 4.8  <5.0 (calc) Final    Non HDL Chol. (LDL+VLDL) 03/29/2024 144 (H)  <130 mg/dL (calc) Final    TSH w/reflex to FT4 03/29/2024 2.47  0.40 - 4.50 mIU/L Final    PROSTATE SPECIFIC ANTIGEN, SCR - Q* 03/29/2024 0.31  < OR = 4.00 ng/mL Final       Past Medical History:    Diagnosis Date    Anxiety     Arthritis     Depression     Factor 5 Leiden mutation, heterozygous     Gastritis 2019    Generalized anxiety disorder     History of nuclear stress test     Hyperlipidemia     Hypertension      Social History     Socioeconomic History    Marital status: Single   Tobacco Use    Smoking status: Former     Current packs/day: 0.00     Types: Cigarettes     Quit date:      Years since quittin.2    Smokeless tobacco: Never   Substance and Sexual Activity    Alcohol use: Yes    Drug use: Never     Past Surgical History:   Procedure Laterality Date    COLONOSCOPY      Dr Don-[facility] 5 yrs     HERNIA REPAIR      SINUS SURGERY  2018    nasal septoplasty/balloon sinuplasty- Dr Roman     Family History   Problem Relation Age of Onset    Heart disease Father        The 10-year CVD risk score (Eda et al., 2008) is: 20.3%    Values used to calculate the score:      Age: 63 years      Sex: Male      Diabetic: No      Tobacco smoker: No      Systolic Blood Pressure: 122 mmHg      Is BP treated: Yes      HDL Cholesterol: 38 mg/dL      Total Cholesterol: 182 mg/dL    All of your core healthy metrics are met.      Review of patient's allergies indicates:   Allergen Reactions    Cefuroxime axetil     Ciprofloxacin Other (See Comments)    Reno Itching    Sulfacetamide sodium-sulfur Other (See Comments)    Tetracycline hcl Other (See Comments)       Current Outpatient Medications:     5-hydroxytryptophan, 5-HTP, 50 mg Cap, Take 1 capsule by mouth nightly., Disp: , Rfl:     ascorbic acid, vitamin C, (VITAMIN C) 1000 MG tablet, 1 tablet once daily., Disp: , Rfl:     b complex vitamins capsule, Take 1 tablet by mouth once daily., Disp: , Rfl:     CALCIUM-MAGNESIUM ORAL, 1 tablet Daily., Disp: , Rfl:     cinnamon bark 500 mg capsule, 1 tablet Daily., Disp: , Rfl:     co-enzyme Q-10 30 mg capsule, Take 30 mg by mouth 3 (three) times daily., Disp: , Rfl:     cranberry 400 mg Cap, 1  capsule once daily., Disp: , Rfl:     flaxseed oiL Oil, 1 capsule by Other route once daily., Disp: , Rfl:     garlic 1,000 mg Cap, 1 capsule once daily., Disp: , Rfl:     MAGNESIUM GLYCINATE ORAL, Take 1 tablet by mouth once daily., Disp: , Rfl:     PHYTOSTEROL ORAL, 1 tablet once daily., Disp: , Rfl:     turmeric, bulk, 100 % Powd, 1 tablet once daily., Disp: , Rfl:     vitamin B comp with C no.4 150 mg Tab, 1 tablet once daily., Disp: , Rfl:     vitamin D (VITAMIN D3) 1000 units Tab, Take 1,000 Units by mouth once daily., Disp: , Rfl:     albuterol (PROAIR HFA) 90 mcg/actuation inhaler, Inhale 2 puffs into the lungs every 6 (six) hours as needed for Wheezing. Rescue (Patient not taking: Reported on 4/2/2024), Disp: 18 g, Rfl: 0    amLODIPine (NORVASC) 10 MG tablet, Take 1 tablet (10 mg total) by mouth once daily., Disp: 90 tablet, Rfl: 3    ascorbic acid, vitamin C, (VITAMIN C) 1000 MG tablet, Take 1 tablet by mouth once daily., Disp: , Rfl:     atenoloL (TENORMIN) 50 MG tablet, Take 1 tablet (50 mg total) by mouth 2 (two) times daily., Disp: 180 tablet, Rfl: 3    cetirizine (ZYRTEC) 10 MG tablet, Take 1 tablet (10 mg total) by mouth once daily., Disp: 90 tablet, Rfl: 3    cinnamon bark 500 mg capsule, Take 1 tablet by mouth once daily., Disp: , Rfl:     clotrimazole-betamethasone 1-0.05% (LOTRISONE) cream, Apply topically 2 (two) times daily., Disp: 30 g, Rfl: 2    cranberry 400 mg Cap, Take 1 capsule by mouth once daily., Disp: , Rfl:     dicyclomine (BENTYL) 20 mg tablet, Take 1 tablet (20 mg total) by mouth 4 (four) times daily., Disp: 50 tablet, Rfl: 2    fluticasone propionate (FLONASE) 50 mcg/actuation nasal spray, 1 spray by Each Nostril route 2 (two) times daily., Disp: , Rfl:     garlic 1,000 mg Cap, Take 1 capsule by mouth once daily., Disp: , Rfl:     glucosam reed dip-chondroit-C-Mn 133-306-82-3 mg Cap, 1 tablet once daily., Disp: , Rfl:     glucosam reed dip-chondroit-C-Mn 592-126-47-3 mg Cap, Take 1  "tablet by mouth once daily., Disp: , Rfl:     hydroCHLOROthiazide (HYDRODIURIL) 25 MG tablet, Take 1 tablet (25 mg total) by mouth once daily., Disp: 90 tablet, Rfl: 3    hydrocortisone 2.5 % cream, Apply topically once daily. (Patient not taking: Reported on 4/2/2024), Disp: 15 g, Rfl: 2    LORazepam (ATIVAN) 0.5 MG tablet, Take 1 tablet (0.5 mg total) by mouth every 12 (twelve) hours as needed for Anxiety., Disp: 30 tablet, Rfl: 3    losartan (COZAAR) 50 MG tablet, Take 1 tablet (50 mg total) by mouth once daily., Disp: 90 tablet, Rfl: 3    magnesium oxide (MAG-OX) 400 mg (241.3 mg magnesium) tablet, Take 400 mg by mouth once daily., Disp: , Rfl:     mv-mins-folic-lycopene-ginkgo 400-300-120 mcg-mcg-mg Tab, 1 tablet once daily., Disp: , Rfl:     PHYTOSTEROL ORAL, Take 1 tablet by mouth once daily., Disp: , Rfl:     turmeric, bulk, 100 % Powd, Take 1 tablet by mouth once daily., Disp: , Rfl:     Review of Systems   Musculoskeletal:  Positive for arthralgias.           Objective:      Vitals:    04/02/24 0952   BP: 122/70   Pulse: 74   SpO2: 97%   Weight: 111.6 kg (246 lb)   Height: 5' 11.65" (1.82 m)     Physical Exam  Vitals and nursing note reviewed.   Constitutional:       General: He is not in acute distress.     Appearance: He is well-developed. He is obese. He is not toxic-appearing.   HENT:      Head: Normocephalic and atraumatic.      Right Ear: Tympanic membrane and external ear normal.      Left Ear: Tympanic membrane and external ear normal.      Nose: Nose normal.      Mouth/Throat:      Mouth: Mucous membranes are moist.      Pharynx: Oropharynx is clear.   Eyes:      Pupils: Pupils are equal, round, and reactive to light.   Neck:      Thyroid: No thyromegaly.      Vascular: No carotid bruit.   Cardiovascular:      Rate and Rhythm: Normal rate and regular rhythm.      Heart sounds: Normal heart sounds. No murmur heard.  Pulmonary:      Effort: Pulmonary effort is normal.      Breath sounds: Normal " breath sounds. No wheezing or rales.   Abdominal:      General: Bowel sounds are normal. There is no distension.      Palpations: Abdomen is soft.      Tenderness: There is no abdominal tenderness.   Musculoskeletal:         General: No tenderness or deformity. Normal range of motion.      Cervical back: Normal range of motion and neck supple.      Lumbar back: Normal. No spasms.      Comments: Bends 90 degrees at  waist, right knee mildly swollen, has decreased flexion in the right knee, no limping today however.  Peripheral edema in the lower legs   Lymphadenopathy:      Cervical: No cervical adenopathy.   Skin:     General: Skin is warm and dry.      Findings: No rash.   Neurological:      Mental Status: He is alert and oriented to person, place, and time.      Cranial Nerves: No cranial nerve deficit.      Coordination: Coordination normal.      Gait: Gait abnormal.   Psychiatric:         Mood and Affect: Mood normal.         Behavior: Behavior normal.         Thought Content: Thought content normal.         Judgment: Judgment normal.           Assessment:       1. Wellness examination    2. Essential hypertension    3. Pansinusitis, unspecified chronicity    4. Tinea cruris    5. Generalized anxiety disorder    6. Irritable bowel syndrome without diarrhea    7. Rosacea    8. Mixed hyperlipidemia    9. Homozygous Factor V Leiden mutation    10. Gastroesophageal reflux disease without esophagitis    11. Internal derangement of right knee         Plan:       Wellness examination  Wellness lab work reviewed with patient.  Recommend getting the final dose of Shingrix vaccine local pharmacy  Essential hypertension  Comments:  Well managed. requests refills.   Orders:  -     amLODIPine (NORVASC) 10 MG tablet; Take 1 tablet (10 mg total) by mouth once daily.  Dispense: 90 tablet; Refill: 3  -     atenoloL (TENORMIN) 50 MG tablet; Take 1 tablet (50 mg total) by mouth 2 (two) times daily.  Dispense: 180 tablet; Refill:  3  -     hydroCHLOROthiazide (HYDRODIURIL) 25 MG tablet; Take 1 tablet (25 mg total) by mouth once daily.  Dispense: 90 tablet; Refill: 3  -     losartan (COZAAR) 50 MG tablet; Take 1 tablet (50 mg total) by mouth once daily.  Dispense: 90 tablet; Refill: 3  -     Comprehensive Metabolic Panel; Future; Expected date: 04/02/2024  -     Lipid Panel; Future; Expected date: 04/02/2024  -     CBC Auto Differential; Future; Expected date: 04/02/2024    Pansinusitis, unspecified chronicity  -     cetirizine (ZYRTEC) 10 MG tablet; Take 1 tablet (10 mg total) by mouth once daily.  Dispense: 90 tablet; Refill: 3    Tinea cruris  -     clotrimazole-betamethasone 1-0.05% (LOTRISONE) cream; Apply topically 2 (two) times daily.  Dispense: 30 g; Refill: 2    Generalized anxiety disorder  -     LORazepam (ATIVAN) 0.5 MG tablet; Take 1 tablet (0.5 mg total) by mouth every 12 (twelve) hours as needed for Anxiety.  Dispense: 30 tablet; Refill: 3  Using lorazepam p.r.n.  Irritable bowel syndrome without diarrhea  -     dicyclomine (BENTYL) 20 mg tablet; Take 1 tablet (20 mg total) by mouth 4 (four) times daily.  Dispense: 50 tablet; Refill: 2    Rosacea    Mixed hyperlipidemia  Cholesterol 182 HDL 38   minimal elevation.  Homozygous Factor V Leiden mutation    Gastroesophageal reflux disease without esophagitis    Internal derangement of right knee  May have a meniscus tear that seems to be improving., conservative treatment only at this time    Follow up in about 6 months (around 10/2/2024).        4/2/2024 Thompson Norman

## 2024-04-03 LAB
ALBUMIN SERPL-MCNC: 4.6 G/DL (ref 3.6–5.1)
ALBUMIN/GLOB SERPL: 1.9 (CALC) (ref 1–2.5)
ALP SERPL-CCNC: 69 U/L (ref 35–144)
ALT SERPL-CCNC: 21 U/L (ref 9–46)
AST SERPL-CCNC: 22 U/L (ref 10–35)
BASOPHILS # BLD AUTO: 42 CELLS/UL (ref 0–200)
BASOPHILS NFR BLD AUTO: 0.9 %
BILIRUB SERPL-MCNC: 1 MG/DL (ref 0.2–1.2)
BUN SERPL-MCNC: 25 MG/DL (ref 7–25)
BUN/CREAT SERPL: ABNORMAL (CALC) (ref 6–22)
CALCIUM SERPL-MCNC: 9.8 MG/DL (ref 8.6–10.3)
CHLORIDE SERPL-SCNC: 102 MMOL/L (ref 98–110)
CHOLEST SERPL-MCNC: 182 MG/DL
CHOLEST/HDLC SERPL: 4.8 (CALC)
CO2 SERPL-SCNC: 24 MMOL/L (ref 20–32)
COLOR UR: NORMAL
CREAT SERPL-MCNC: 1.31 MG/DL (ref 0.7–1.35)
EGFR: 62 ML/MIN/1.73M2
EOSINOPHIL # BLD AUTO: 249 CELLS/UL (ref 15–500)
EOSINOPHIL NFR BLD AUTO: 5.3 %
ERYTHROCYTE [DISTWIDTH] IN BLOOD BY AUTOMATED COUNT: 12.5 % (ref 11–15)
GLOBULIN SER CALC-MCNC: 2.4 G/DL (CALC) (ref 1.9–3.7)
GLUCOSE SERPL-MCNC: 106 MG/DL (ref 65–99)
HCT VFR BLD AUTO: 42.4 % (ref 38.5–50)
HDLC SERPL-MCNC: 38 MG/DL
HGB BLD-MCNC: 14.6 G/DL (ref 13.2–17.1)
LDLC SERPL CALC-MCNC: 120 MG/DL (CALC)
LYMPHOCYTES # BLD AUTO: 1147 CELLS/UL (ref 850–3900)
LYMPHOCYTES NFR BLD AUTO: 24.4 %
MCH RBC QN AUTO: 30.7 PG (ref 27–33)
MCHC RBC AUTO-ENTMCNC: 34.4 G/DL (ref 32–36)
MCV RBC AUTO: 89.1 FL (ref 80–100)
MONOCYTES # BLD AUTO: 555 CELLS/UL (ref 200–950)
MONOCYTES NFR BLD AUTO: 11.8 %
NEUTROPHILS # BLD AUTO: 2707 CELLS/UL (ref 1500–7800)
NEUTROPHILS NFR BLD AUTO: 57.6 %
NONHDLC SERPL-MCNC: 144 MG/DL (CALC)
PLATELET # BLD AUTO: 227 THOUSAND/UL (ref 140–400)
PMV BLD REES-ECKER: 10.6 FL (ref 7.5–12.5)
POTASSIUM SERPL-SCNC: 4 MMOL/L (ref 3.5–5.3)
PROT SERPL-MCNC: 7 G/DL (ref 6.1–8.1)
PSA SERPL-MCNC: 0.31 NG/ML
RBC # BLD AUTO: 4.76 MILLION/UL (ref 4.2–5.8)
SODIUM SERPL-SCNC: 138 MMOL/L (ref 135–146)
TRIGL SERPL-MCNC: 128 MG/DL
TSH SERPL-ACNC: 2.47 MIU/L (ref 0.4–4.5)
WBC # BLD AUTO: 4.7 THOUSAND/UL (ref 3.8–10.8)

## 2024-04-04 ENCOUNTER — TELEPHONE (OUTPATIENT)
Dept: FAMILY MEDICINE | Facility: CLINIC | Age: 64
End: 2024-04-04
Payer: COMMERCIAL

## 2024-04-04 NOTE — TELEPHONE ENCOUNTER
----- Message from Susie Ward sent at 4/4/2024  2:27 PM CDT -----  Pt would like a copy of his lab results.    [phone]

## 2024-04-24 NOTE — TELEPHONE ENCOUNTER
Spoke with patient and let him know we did not send him any portal messages. I let him know it may have been an automated message regarding his upcoming appt. He is aware to have labs prior, fasting at [facility].    Your provider has recommended orthotics/shoes/or a brace. Please call first for an appointment at one of the following company locations:    [facility]  Phone: [phone]    Fax: [phone]    [facility] location:  [address] [address]    Please note, effective May 1, 2024 [facility] is moving to the following location:  [address] [id number]            [facility]  Phone: [phone]   Fax: [phone]  [facility] [place] [address]        [facility]  Phone: [phone]   Fax: [phone]  [facility] [place] [address]

## 2024-05-06 DIAGNOSIS — D68.51 HOMOZYGOUS FACTOR V LEIDEN MUTATION: Primary | ICD-10-CM

## 2024-05-06 NOTE — TELEPHONE ENCOUNTER
----- Message from TRINITY Portillo sent at 5/6/2024  1:06 PM CDT -----  Yes to compression socks and I would give him a dose for the morning of each flight 40 mg       Kajal  ----- Message -----  From: Loly Early RN  Sent: 5/6/2024  11:48 AM CDT  To: TRINITY Portillo    Compression Socks okay? Should he be on lovenox?  ----- Message -----  From: Socorro Belcher  Sent: 5/6/2024  11:46 AM CDT  To: Prasanth Castanon Staff    The patient called to ask about taking a flight to [place] on 5/23. He said it is about an 8-9 hr flight. He wants to know if he needs to take lovenox and if he should wear compression socks. Please call him back at [phone].

## 2024-05-07 ENCOUNTER — TELEPHONE (OUTPATIENT)
Dept: HEMATOLOGY/ONCOLOGY | Facility: CLINIC | Age: 64
End: 2024-05-07

## 2024-05-07 RX ORDER — ENOXAPARIN SODIUM 100 MG/ML
INJECTION SUBCUTANEOUS
Qty: 2 EACH | Refills: 0 | Status: SHIPPED | OUTPATIENT
Start: 2024-05-07

## 2024-05-07 NOTE — TELEPHONE ENCOUNTER
----- Message from TRINITY Portillo sent at 5/7/2024  1:25 PM CDT -----  Yes maam that fine  ----- Message -----  From: Loly Early, RN  Sent: 5/7/2024  10:37 AM CDT  To: TRINITY Portillo    Still take aspirin with Lovenox? I'm assuming yes but let me know  ----- Message -----  From: Jenifer Ortez  Sent: 5/7/2024  10:24 AM CDT  To: Loly Early RN    Does he still take aspirin with Lovenox?     [phone]

## 2024-05-07 NOTE — TELEPHONE ENCOUNTER
Attempted to call patient with above, no answer. Voicemail left with above and instructions to call back with any questions.

## 2024-05-13 ENCOUNTER — TELEPHONE (OUTPATIENT)
Dept: HEMATOLOGY/ONCOLOGY | Facility: CLINIC | Age: 64
End: 2024-05-13

## 2024-05-13 NOTE — TELEPHONE ENCOUNTER
Patient called in to confirm the compression strength he would need for his compression socks. Confirmed with TRINITY Portillo that patient would need 10-20mmHg for his compression socks. Attempted to call patient with above. No answer. Voicemail left with instructions of above and to call back with any questions.

## 2024-07-22 DIAGNOSIS — D68.51 HOMOZYGOUS FACTOR V LEIDEN MUTATION: Primary | ICD-10-CM

## 2024-07-22 RX ORDER — ENOXAPARIN SODIUM 100 MG/ML
40 INJECTION SUBCUTANEOUS DAILY
Qty: 2 EACH | Refills: 0 | Status: SHIPPED | OUTPATIENT
Start: 2024-07-22

## 2024-07-22 RX ORDER — ASPIRIN 81 MG/1
81 TABLET ORAL DAILY
Qty: 30 TABLET | Refills: 3 | Status: SHIPPED | OUTPATIENT
Start: 2024-07-22 | End: 2024-11-19

## 2024-07-22 NOTE — TELEPHONE ENCOUNTER
Called patient regarding anticoagulant treatment prior to fly, per patient preferences he does not want to take daily medications for anticoagulation. Per Kajal Adhikari NP, patient will need Lovenox 40 mg 1 subcutaneous dosis prior to the first fly and 1 dosis prior to second fly, patient needs to restart on aspirin 81 mg po daily, patient stated understanding.

## 2024-10-01 ENCOUNTER — TELEPHONE (OUTPATIENT)
Dept: FAMILY MEDICINE | Facility: CLINIC | Age: 64
End: 2024-10-01
Payer: COMMERCIAL

## 2024-10-01 NOTE — TELEPHONE ENCOUNTER
Spoke to patient that fasting lab is due a week prior to visit, orders at [facility], encouraged water. Advised he can take morning meds that do not need to be taken with food.    Patient said he would like to discuss getting labs at visit prior to getting done. He is worried they won't be covered.

## 2024-10-15 ENCOUNTER — TELEPHONE (OUTPATIENT)
Dept: FAMILY MEDICINE | Facility: CLINIC | Age: 64
End: 2024-10-15

## 2024-10-15 ENCOUNTER — OFFICE VISIT (OUTPATIENT)
Dept: FAMILY MEDICINE | Facility: CLINIC | Age: 64
End: 2024-10-15
Payer: COMMERCIAL

## 2024-10-15 VITALS
DIASTOLIC BLOOD PRESSURE: 80 MMHG | WEIGHT: 245 LBS | HEIGHT: 72 IN | HEART RATE: 67 BPM | SYSTOLIC BLOOD PRESSURE: 120 MMHG | BODY MASS INDEX: 33.18 KG/M2

## 2024-10-15 DIAGNOSIS — D68.51 HOMOZYGOUS FACTOR V LEIDEN MUTATION: ICD-10-CM

## 2024-10-15 DIAGNOSIS — K21.9 GASTROESOPHAGEAL REFLUX DISEASE WITHOUT ESOPHAGITIS: ICD-10-CM

## 2024-10-15 DIAGNOSIS — I10 ESSENTIAL HYPERTENSION: Primary | ICD-10-CM

## 2024-10-15 DIAGNOSIS — Z23 NEED FOR INFLUENZA VACCINATION: ICD-10-CM

## 2024-10-15 DIAGNOSIS — E78.2 MIXED HYPERLIPIDEMIA: ICD-10-CM

## 2024-10-15 DIAGNOSIS — L91.8 ACQUIRED SKIN TAG: ICD-10-CM

## 2024-10-15 DIAGNOSIS — K58.9 IRRITABLE BOWEL SYNDROME WITHOUT DIARRHEA: ICD-10-CM

## 2024-10-15 DIAGNOSIS — F41.1 GENERALIZED ANXIETY DISORDER: ICD-10-CM

## 2024-10-15 PROCEDURE — 99214 OFFICE O/P EST MOD 30 MIN: CPT | Mod: 25,S$GLB,, | Performed by: FAMILY MEDICINE

## 2024-10-15 PROCEDURE — 1159F MED LIST DOCD IN RCRD: CPT | Mod: CPTII,S$GLB,, | Performed by: FAMILY MEDICINE

## 2024-10-15 PROCEDURE — 4010F ACE/ARB THERAPY RXD/TAKEN: CPT | Mod: CPTII,S$GLB,, | Performed by: FAMILY MEDICINE

## 2024-10-15 PROCEDURE — 90471 IMMUNIZATION ADMIN: CPT | Mod: S$GLB,,, | Performed by: FAMILY MEDICINE

## 2024-10-15 PROCEDURE — 3079F DIAST BP 80-89 MM HG: CPT | Mod: CPTII,S$GLB,, | Performed by: FAMILY MEDICINE

## 2024-10-15 PROCEDURE — 3074F SYST BP LT 130 MM HG: CPT | Mod: CPTII,S$GLB,, | Performed by: FAMILY MEDICINE

## 2024-10-15 PROCEDURE — 90656 IIV3 VACC NO PRSV 0.5 ML IM: CPT | Mod: S$GLB,,, | Performed by: FAMILY MEDICINE

## 2024-10-15 PROCEDURE — 3008F BODY MASS INDEX DOCD: CPT | Mod: CPTII,S$GLB,, | Performed by: FAMILY MEDICINE

## 2024-10-15 RX ORDER — HYDROCHLOROTHIAZIDE 25 MG/1
25 TABLET ORAL DAILY
Qty: 90 TABLET | Refills: 3 | Status: SHIPPED | OUTPATIENT
Start: 2024-10-15

## 2024-10-15 RX ORDER — ATENOLOL 50 MG/1
50 TABLET ORAL 2 TIMES DAILY
Qty: 180 TABLET | Refills: 3 | Status: SHIPPED | OUTPATIENT
Start: 2024-10-15

## 2024-10-15 RX ORDER — LOSARTAN POTASSIUM 50 MG/1
50 TABLET ORAL DAILY
Qty: 90 TABLET | Refills: 3 | Status: SHIPPED | OUTPATIENT
Start: 2024-10-15

## 2024-10-15 RX ORDER — DICYCLOMINE HYDROCHLORIDE 20 MG/1
20 TABLET ORAL 4 TIMES DAILY
Qty: 50 TABLET | Refills: 2 | Status: SHIPPED | OUTPATIENT
Start: 2024-10-15

## 2024-10-15 RX ORDER — LORAZEPAM 0.5 MG/1
0.5 TABLET ORAL EVERY 12 HOURS PRN
Qty: 30 TABLET | Refills: 3 | Status: SHIPPED | OUTPATIENT
Start: 2024-10-15

## 2024-10-15 RX ORDER — AMLODIPINE BESYLATE 10 MG/1
10 TABLET ORAL DAILY
Qty: 90 TABLET | Refills: 3 | Status: SHIPPED | OUTPATIENT
Start: 2024-10-15

## 2024-10-15 NOTE — TELEPHONE ENCOUNTER
----- Message from Janice sent at 10/15/2024  2:48 PM CDT -----  Pt would like to know more information on sleep study.   [phone]

## 2024-10-15 NOTE — TELEPHONE ENCOUNTER
Spoke with patient, states his friend is a retired respiratory therapist and she recommended him have a sleep study because he has fallen asleep a few times at her house and she told him he stops breathing sometimes in his sleep. States he is restless. Wants to see if dr. Norman will order a sleep study. Message on dr. Hearn desk.

## 2024-10-16 ENCOUNTER — TELEPHONE (OUTPATIENT)
Dept: FAMILY MEDICINE | Facility: CLINIC | Age: 64
End: 2024-10-16
Payer: COMMERCIAL

## 2024-10-16 NOTE — TELEPHONE ENCOUNTER
----- Message from Carlee sent at 10/16/2024  2:43 PM CDT -----  The patient saw Dr. Norman yesterday. He does not use his portal if a message was put on there. He called yesterday afternoon about sleep apnea.  Please call pt's # [phone] GH

## 2024-10-16 NOTE — TELEPHONE ENCOUNTER
Spoke with patient and let him know Dr. Norman said we can order a home sleep study. Form on Dr. Hearn desk. Aware they will be calling him in the next few days.

## 2024-10-17 NOTE — PROGRESS NOTES
SUBJECTIVE:    Patient ID: Otto Dawson is a 63 y.o. male.    Chief Complaint: Follow-up (Discuss about multiple issues,flu vaccine ordered, need refills, family stress, abc )    63-year-old male under stress as his sister has been diagnosed with Lewy body dementia and has been hospitalized and in memory care unit.  He is not following a diet recently and has been eating much more fast foods.    He is still working several jobs to make ends meet.    Right shoulder aches at night, ibuprofen seems to help    Aspirin 81 mg daily due to factor 5 Leiden mutation.  He would like to use Lovenox on long trips.    [id number] 7 years    Right axillary large skin tag, he would like it removed today    Hypertension well-controlled    Follow-up  Pertinent negatives include no abdominal pain, chest pain, chills, coughing, fatigue, fever, joint swelling, myalgias, nausea, numbness or vomiting.       No visits with results within 6 Month(s) from this visit.   Latest known visit with results is:   Office Visit on 09/25/2023   Component Date Value Ref Range Status    WBC 03/29/2024 4.7  3.8 - 10.8 Thousand/uL Final    RBC 03/29/2024 4.76  4.20 - 5.80 Million/uL Final    Hemoglobin 03/29/2024 14.6  13.2 - 17.1 g/dL Final    Hematocrit 03/29/2024 42.4  38.5 - 50.0 % Final    MCV 03/29/2024 89.1  80.0 - 100.0 fL Final    MCH 03/29/2024 30.7  27.0 - 33.0 pg Final    MCHC 03/29/2024 34.4  32.0 - 36.0 g/dL Final    RDW 03/29/2024 12.5  11.0 - 15.0 % Final    Platelets 03/29/2024 227  140 - 400 Thousand/uL Final    MPV 03/29/2024 10.6  7.5 - 12.5 fL Final    Neutrophils, Abs 03/29/2024 2,707  1,500 - 7,800 cells/uL Final    Lymph # 03/29/2024 1,147  850 - 3,900 cells/uL Final    Mono # 03/29/2024 555  200 - 950 cells/uL Final    Eos # 03/29/2024 249  15 - 500 cells/uL Final    Baso # 03/29/2024 42  0 - 200 cells/uL Final    Neutrophils Relative 03/29/2024 57.6  % Final    Lymph % 03/29/2024 24.4  % Final    Mono %  03/29/2024 11.8  % Final    Eosinophil % 03/29/2024 5.3  % Final    Basophil % 03/29/2024 0.9  % Final    Glucose 03/29/2024 106 (H)  65 - 99 mg/dL Final    BUN 03/29/2024 25  7 - 25 mg/dL Final    Creatinine 03/29/2024 1.31  0.70 - 1.35 mg/dL Final    eGFR 03/29/2024 62  > OR = 60 mL/min/1.73m2 Final    BUN/Creatinine Ratio 03/29/2024 SEE NOTE:  6 - 22 (calc) Final    Sodium 03/29/2024 138  135 - 146 mmol/L Final    Potassium 03/29/2024 4.0  3.5 - 5.3 mmol/L Final    Chloride 03/29/2024 102  98 - 110 mmol/L Final    CO2 03/29/2024 24  20 - 32 mmol/L Final    Calcium 03/29/2024 9.8  8.6 - 10.3 mg/dL Final    Total Protein 03/29/2024 7.0  6.1 - 8.1 g/dL Final    Albumin 03/29/2024 4.6  3.6 - 5.1 g/dL Final    Globulin, Total 03/29/2024 2.4  1.9 - 3.7 g/dL (calc) Final    Albumin/Globulin Ratio 03/29/2024 1.9  1.0 - 2.5 (calc) Final    Total Bilirubin 03/29/2024 1.0  0.2 - 1.2 mg/dL Final    Alkaline Phosphatase 03/29/2024 69  35 - 144 U/L Final    AST 03/29/2024 22  10 - 35 U/L Final    ALT 03/29/2024 21  9 - 46 U/L Final    Cholesterol 03/29/2024 182  <200 mg/dL Final    HDL 03/29/2024 38 (L)  > OR = 40 mg/dL Final    Triglycerides 03/29/2024 128  <150 mg/dL Final    LDL Cholesterol 03/29/2024 120 (H)  mg/dL (calc) Final    HDL/Cholesterol Ratio 03/29/2024 4.8  <5.0 (calc) Final    Non HDL Chol. (LDL+VLDL) 03/29/2024 144 (H)  <130 mg/dL (calc) Final    Color, UA 03/29/2024 TNP   Final    TSH w/reflex to FT4 03/29/2024 2.47  0.40 - 4.50 mIU/L Final    PROSTATE SPECIFIC ANTIGEN, SCR - Q* 03/29/2024 0.31  < OR = 4.00 ng/mL Final       Past Medical History:   Diagnosis Date    Anxiety     Arthritis     Depression     Factor 5 Leiden mutation, heterozygous     Gastritis 9/16/2019    Generalized anxiety disorder     History of nuclear stress test 2014    Hyperlipidemia     Hypertension      Social History     Socioeconomic History    Marital status: Single   Tobacco Use    Smoking status: Former     Current packs/day:  0.00     Types: Cigarettes     Quit date:      Years since quittin.8    Smokeless tobacco: Never   Substance and Sexual Activity    Alcohol use: Yes    Drug use: Never     Past Surgical History:   Procedure Laterality Date    COLONOSCOPY  2018    Dr Don-rtc 5 yrs     HERNIA REPAIR      SINUS SURGERY  2018    nasal septoplasty/balloon sinuplasty- Dr Roman     Family History   Problem Relation Name Age of Onset    Heart disease Father         The 10-year CVD risk score (Eda et al., 2008) is: 19.7%    Values used to calculate the score:      Age: 63 years      Sex: Male      Diabetic: No      Tobacco smoker: No      Systolic Blood Pressure: 120 mmHg      Is BP treated: Yes      HDL Cholesterol: 38 mg/dL      Total Cholesterol: 182 mg/dL    All of your core healthy metrics are met.      Review of patient's allergies indicates:   Allergen Reactions    Cefuroxime axetil     Ciprofloxacin Other (See Comments)    [place]eno Itching    Sulfacetamide sodium-sulfur Other (See Comments)    Tetracycline hcl Other (See Comments)       Current Outpatient Medications:     5-hydroxytryptophan, 5-HTP, 50 mg Cap, Take 1 capsule by mouth nightly., Disp: , Rfl:     ascorbic acid, vitamin C, (VITAMIN C) 1000 MG tablet, 1 tablet once daily., Disp: , Rfl:     ascorbic acid, vitamin C, (VITAMIN C) 1000 MG tablet, Take 1 tablet by mouth once daily., Disp: , Rfl:     aspirin (ECOTRIN) 81 MG EC tablet, Take 1 tablet (81 mg total) by mouth once daily., Disp: 30 tablet, Rfl: 3    b complex vitamins capsule, Take 1 tablet by mouth once daily., Disp: , Rfl:     CALCIUM-MAGNESIUM ORAL, 1 tablet Daily., Disp: , Rfl:     cetirizine (ZYRTEC) 10 MG tablet, Take 1 tablet (10 mg total) by mouth once daily., Disp: 90 tablet, Rfl: 3    cinnamon bark 500 mg capsule, 1 tablet Daily., Disp: , Rfl:     cinnamon bark 500 mg capsule, Take 1 tablet by mouth once daily., Disp: , Rfl:     clotrimazole-betamethasone 1-0.05% (LOTRISONE) cream, Apply  topically 2 (two) times daily., Disp: 30 g, Rfl: 2    co-enzyme Q-10 30 mg capsule, Take 30 mg by mouth 3 (three) times daily., Disp: , Rfl:     cranberry 400 mg Cap, 1 capsule once daily., Disp: , Rfl:     cranberry 400 mg Cap, Take 1 capsule by mouth once daily., Disp: , Rfl:     enoxaparin (LOVENOX) 40 mg/0.4 mL Syrg, Inject 0.4 mLs (40 mg total) into the skin Daily., Disp: 2 each, Rfl: 0    flaxseed oiL Oil, 1 capsule by Other route once daily., Disp: , Rfl:     fluticasone propionate (FLONASE) 50 mcg/actuation nasal spray, 1 spray by Each Nostril route 2 (two) times daily., Disp: , Rfl:     garlic 1,000 mg Cap, 1 capsule once daily., Disp: , Rfl:     garlic 1,000 mg Cap, Take 1 capsule by mouth once daily., Disp: , Rfl:     glucosam reed dip-chondroit-C-Mn 086-599-17-3 mg Cap, 1 tablet once daily., Disp: , Rfl:     glucosam reed dip-chondroit-C-Mn 316-077-81-3 mg Cap, Take 1 tablet by mouth once daily., Disp: , Rfl:     hydrocortisone 2.5 % cream, Apply topically once daily., Disp: 15 g, Rfl: 2    MAGNESIUM GLYCINATE ORAL, Take 1 tablet by mouth once daily., Disp: , Rfl:     magnesium oxide (MAG-OX) 400 mg (241.3 mg magnesium) tablet, Take 400 mg by mouth once daily., Disp: , Rfl:     mv-mins-folic-lycopene-ginkgo 400-300-120 mcg-mcg-mg Tab, 1 tablet once daily., Disp: , Rfl:     PHYTOSTEROL ORAL, 1 tablet once daily., Disp: , Rfl:     PHYTOSTEROL ORAL, Take 1 tablet by mouth once daily., Disp: , Rfl:     turmeric, bulk, 100 % Powd, 1 tablet once daily., Disp: , Rfl:     turmeric, bulk, 100 % Powd, Take 1 tablet by mouth once daily., Disp: , Rfl:     vitamin B comp with C no.4 150 mg Tab, 1 tablet once daily., Disp: , Rfl:     vitamin D (VITAMIN D3) 1000 units Tab, Take 1,000 Units by mouth once daily., Disp: , Rfl:     albuterol (PROAIR HFA) 90 mcg/actuation inhaler, Inhale 2 puffs into the lungs every 6 (six) hours as needed for Wheezing. Rescue (Patient not taking: Reported on 4/2/2024), Disp: 18 g, Rfl: 0     amLODIPine (NORVASC) 10 MG tablet, Take 1 tablet (10 mg total) by mouth once daily., Disp: 90 tablet, Rfl: 3    atenoloL (TENORMIN) 50 MG tablet, Take 1 tablet (50 mg total) by mouth 2 (two) times daily., Disp: 180 tablet, Rfl: 3    dicyclomine (BENTYL) 20 mg tablet, Take 1 tablet (20 mg total) by mouth 4 (four) times daily., Disp: 50 tablet, Rfl: 2    hydroCHLOROthiazide (HYDRODIURIL) 25 MG tablet, Take 1 tablet (25 mg total) by mouth once daily., Disp: 90 tablet, Rfl: 3    LORazepam (ATIVAN) 0.5 MG tablet, Take 1 tablet (0.5 mg total) by mouth every 12 (twelve) hours as needed for Anxiety., Disp: 30 tablet, Rfl: 3    losartan (COZAAR) 50 MG tablet, Take 1 tablet (50 mg total) by mouth once daily., Disp: 90 tablet, Rfl: 3    Review of Systems   Constitutional:  Negative for appetite change, chills, fatigue, fever and unexpected weight change.   HENT:  Negative for ear pain and trouble swallowing.    Eyes:  Negative for pain, discharge and visual disturbance.   Respiratory:  Negative for apnea, cough, shortness of breath and wheezing.    Cardiovascular:  Negative for chest pain and leg swelling.   Gastrointestinal:  Negative for abdominal pain, blood in stool, constipation, diarrhea, nausea, vomiting and reflux.   Endocrine: Negative for cold intolerance, heat intolerance and polydipsia.   Genitourinary:  Negative for bladder incontinence, dysuria, erectile dysfunction, frequency, hematuria, testicular pain and urgency.   Musculoskeletal:  Negative for gait problem, joint swelling and myalgias.   Neurological:  Negative for dizziness, seizures and numbness.   Psychiatric/Behavioral:  Negative for agitation, behavioral problems and hallucinations. The patient is not nervous/anxious.            Objective:      Vitals:    10/15/24 1152   BP: 120/80   Pulse: 67   Weight: 111.1 kg (245 lb)   Height: 6' (1.829 m)     Physical Exam  Vitals and nursing note reviewed.   Constitutional:       General: He is not in acute  distress.     Appearance: Normal appearance. He is well-developed. He is obese. He is not toxic-appearing.   HENT:      Head: Normocephalic and atraumatic.      Right Ear: Tympanic membrane and external ear normal.      Left Ear: Tympanic membrane and external ear normal.      Nose: Nose normal.      Mouth/Throat:      Pharynx: Oropharynx is clear. No posterior oropharyngeal erythema.   Eyes:      Pupils: Pupils are equal, round, and reactive to light.   Neck:      Thyroid: No thyromegaly.      Vascular: No carotid bruit.   Cardiovascular:      Rate and Rhythm: Normal rate and regular rhythm.      Heart sounds: Normal heart sounds. No murmur heard.  Pulmonary:      Effort: Pulmonary effort is normal.      Breath sounds: Normal breath sounds. No wheezing or rales.   Abdominal:      General: Bowel sounds are normal. There is no distension.      Palpations: Abdomen is soft.      Tenderness: There is no abdominal tenderness.   Musculoskeletal:         General: No tenderness or deformity. Normal range of motion.      Cervical back: Normal range of motion and neck supple.      Lumbar back: Normal. No spasms.      Comments: Bends 90 degrees at  waist, shoulders and knees have full range of motion, no pitting edema to lower extremities, right shoulder has full range of motion no painful arc today.   Lymphadenopathy:      Cervical: No cervical adenopathy.   Skin:     General: Skin is warm and dry.      Findings: No rash.      Comments: Large pedunculated skin tag in the right axilla   Neurological:      General: No focal deficit present.      Mental Status: He is alert and oriented to person, place, and time. Mental status is at baseline.      Cranial Nerves: No cranial nerve deficit.      Coordination: Coordination normal.   Psychiatric:         Mood and Affect: Mood normal.         Behavior: Behavior normal.         Thought Content: Thought content normal.         Judgment: Judgment normal.           Assessment:       1.  Essential hypertension    2. Generalized anxiety disorder    3. Need for influenza vaccination    4. Irritable bowel syndrome without diarrhea    5. Mixed hyperlipidemia    6. Homozygous Factor V Leiden mutation    7. Gastroesophageal reflux disease without esophagitis    8. Acquired skin tag         Plan:       Essential hypertension  Comments:  Well managed. requests refills. Plans to get labs done 6 mo  Orders:  -     atenoloL (TENORMIN) 50 MG tablet; Take 1 tablet (50 mg total) by mouth 2 (two) times daily.  Dispense: 180 tablet; Refill: 3  -     amLODIPine (NORVASC) 10 MG tablet; Take 1 tablet (10 mg total) by mouth once daily.  Dispense: 90 tablet; Refill: 3  -     hydroCHLOROthiazide (HYDRODIURIL) 25 MG tablet; Take 1 tablet (25 mg total) by mouth once daily.  Dispense: 90 tablet; Refill: 3  -     losartan (COZAAR) 50 MG tablet; Take 1 tablet (50 mg total) by mouth once daily.  Dispense: 90 tablet; Refill: 3    Generalized anxiety disorder  -     LORazepam (ATIVAN) 0.5 MG tablet; Take 1 tablet (0.5 mg total) by mouth every 12 (twelve) hours as needed for Anxiety.  Dispense: 30 tablet; Refill: 3  Refill lorazepam  Need for influenza vaccination  -     influenza (Flulaval, Fluzone, Fluarix) 45 mcg/0.5 mL IM vaccine (> or = 6 mo) 0.5 mL  Flu shot today  Irritable bowel syndrome without diarrhea  -     dicyclomine (BENTYL) 20 mg tablet; Take 1 tablet (20 mg total) by mouth 4 (four) times daily.  Dispense: 50 tablet; Refill: 2  Trial of Bentyl p.r.n. spasms  Mixed hyperlipidemia  Recheck labs again in six-months  Homozygous Factor V Leiden mutation  Aspirin 81 mg daily, Lovenox on trips  Gastroesophageal reflux disease without esophagitis  Not requiring medications at this time  Acquired skin tag  Removed under local anesthesia, good hemostasis obtained    Follow up in about 6 months (around 4/15/2025).        10/16/2024 Thompson Norman

## 2024-10-18 ENCOUNTER — TELEPHONE (OUTPATIENT)
Dept: FAMILY MEDICINE | Facility: CLINIC | Age: 64
End: 2024-10-18
Payer: COMMERCIAL

## 2024-10-18 NOTE — TELEPHONE ENCOUNTER
----- Message from Carlee sent at 10/18/2024 11:57 AM CDT -----  Marva from [facility] called.  They received the referral. They need the office notes faxed to them. Phone # [phone] ext [phone] fax # [phone] [facility]

## 2024-10-21 ENCOUNTER — TELEPHONE (OUTPATIENT)
Dept: FAMILY MEDICINE | Facility: CLINIC | Age: 64
End: 2024-10-21
Payer: COMMERCIAL

## 2024-10-21 NOTE — TELEPHONE ENCOUNTER
----- Message from Lazara sent at 10/18/2024  1:27 PM CDT -----  Type: General Call Back     Name of Caller:OPAL GORE [[id number]]  Symptoms:  Would the patient rather a call back or a response via [facility]? call  Best Call Back Number:[phone] ext [phone]  Additional Information: Marva states that the office visit notes does not say home sleep study in them so a referral can't be made yet. They state that they need it sent over again with those words in the office visit. Thanks.

## 2024-10-22 ENCOUNTER — TELEPHONE (OUTPATIENT)
Dept: FAMILY MEDICINE | Facility: CLINIC | Age: 64
End: 2024-10-22
Payer: COMMERCIAL

## 2024-10-22 PROBLEM — G47.33 OBSTRUCTIVE SLEEP APNEA: Status: ACTIVE | Noted: 2024-10-22

## 2024-10-22 NOTE — TELEPHONE ENCOUNTER
----- Message from Carlee sent at 10/22/2024  2:19 PM CDT -----  Phoebe from [facility] called. She called last week about getting office note on this patein. The notes have to have that he needs a sleep study.  Phone # [phone] ext [phone] fax # [phone] [facility]

## 2024-10-24 ENCOUNTER — TELEPHONE (OUTPATIENT)
Dept: FAMILY MEDICINE | Facility: CLINIC | Age: 64
End: 2024-10-24
Payer: COMMERCIAL

## 2024-10-24 NOTE — TELEPHONE ENCOUNTER
----- Message from Jermaine Fish sent at 10/24/2024  9:19 AM CDT -----  phoebe with [facility] is still waiting on updated notes have to states pt is inquiring to get home sleep test     # [phone] ex [phone]

## 2024-12-17 ENCOUNTER — OFFICE VISIT (OUTPATIENT)
Facility: CLINIC | Age: 64
End: 2024-12-17
Payer: COMMERCIAL

## 2024-12-17 VITALS
BODY MASS INDEX: 32.82 KG/M2 | HEART RATE: 71 BPM | RESPIRATION RATE: 16 BRPM | DIASTOLIC BLOOD PRESSURE: 70 MMHG | SYSTOLIC BLOOD PRESSURE: 138 MMHG | TEMPERATURE: 97 F | WEIGHT: 242 LBS

## 2024-12-17 DIAGNOSIS — D68.51 HOMOZYGOUS FACTOR V LEIDEN MUTATION: Primary | ICD-10-CM

## 2024-12-17 PROCEDURE — G2211 COMPLEX E/M VISIT ADD ON: HCPCS | Mod: S$GLB,,, | Performed by: INTERNAL MEDICINE

## 2024-12-17 PROCEDURE — 99213 OFFICE O/P EST LOW 20 MIN: CPT | Mod: S$GLB,,, | Performed by: INTERNAL MEDICINE

## 2024-12-17 PROCEDURE — 3075F SYST BP GE 130 - 139MM HG: CPT | Mod: CPTII,S$GLB,, | Performed by: INTERNAL MEDICINE

## 2024-12-17 PROCEDURE — 1159F MED LIST DOCD IN RCRD: CPT | Mod: CPTII,S$GLB,, | Performed by: INTERNAL MEDICINE

## 2024-12-17 PROCEDURE — 99999 PR PBB SHADOW E&M-EST. PATIENT-LVL V: CPT | Mod: PBBFAC,,, | Performed by: INTERNAL MEDICINE

## 2024-12-17 PROCEDURE — 4010F ACE/ARB THERAPY RXD/TAKEN: CPT | Mod: CPTII,S$GLB,, | Performed by: INTERNAL MEDICINE

## 2024-12-17 PROCEDURE — 3078F DIAST BP <80 MM HG: CPT | Mod: CPTII,S$GLB,, | Performed by: INTERNAL MEDICINE

## 2024-12-17 PROCEDURE — 3008F BODY MASS INDEX DOCD: CPT | Mod: CPTII,S$GLB,, | Performed by: INTERNAL MEDICINE

## 2024-12-17 NOTE — ASSESSMENT & PLAN NOTE
Patient is doing well and has not had a clotting event.  Will continue on ASA and continue to see him on a yearly basis.  Will continue the use of lovenox in high risk situations as previous.  Also continue to assist with high risk situations/clearances as needed.

## 2024-12-17 NOTE — PROGRESS NOTES
PROGRESS NOTE    Subjective:       Patient ID: Otto Dawson is a 64 y.o. male.    10/20/2016-Lab:  FVL:  Homozygous    2023:  Colonoscopy normal    Chief Complaint:  No chief complaint on file.  Factor V Leiden follow up    History of Present Illness:   Otto Dawson is a 64 y.o. male who presents for follow up of above.     No new complaints at this time.  He has had no clotting events.          PMH:  Mr. Dawson is a 61yo male who is referred due to a family history of factor V leiden.  He was tested on 10/20/2016 as a homozygous carrier.  He has not had a blood clot in his life time.  He notes several surgeries with no problems but has had lovenox prophylaxis for these procedures.      He has an aunt who is homozygous who did have approximately 3 clots throughout her life and is  but not from a clotting issue.  He also has another sister who is homozygous and had approx 3 blood clots.  He has 2 other family members who are heterozygous and have not had blood clotting issues.      Family and Social history reviewed and is unchanged from 2023             Current Outpatient Medications:     5-hydroxytryptophan, 5-HTP, 50 mg Cap, Take 1 capsule by mouth nightly., Disp: , Rfl:     amLODIPine (NORVASC) 10 MG tablet, Take 1 tablet (10 mg total) by mouth once daily., Disp: 90 tablet, Rfl: 3    ascorbic acid, vitamin C, (VITAMIN C) 1000 MG tablet, 1 tablet once daily., Disp: , Rfl:     ascorbic acid, vitamin C, (VITAMIN C) 1000 MG tablet, Take 1 tablet by mouth once daily., Disp: , Rfl:     atenoloL (TENORMIN) 50 MG tablet, Take 1 tablet (50 mg total) by mouth 2 (two) times daily., Disp: 180 tablet, Rfl: 3    b complex vitamins capsule, Take 1 tablet by mouth once daily., Disp: , Rfl:     CALCIUM-MAGNESIUM ORAL, 1 tablet Daily., Disp: , Rfl:     cetirizine (ZYRTEC) 10 MG tablet, Take 1 tablet (10 mg total) by mouth once daily., Disp: 90  tablet, Rfl: 3    cinnamon bark 500 mg capsule, 1 tablet Daily., Disp: , Rfl:     cinnamon bark 500 mg capsule, Take 1 tablet by mouth once daily., Disp: , Rfl:     clotrimazole-betamethasone 1-0.05% (LOTRISONE) cream, Apply topically 2 (two) times daily., Disp: 30 g, Rfl: 2    co-enzyme Q-10 30 mg capsule, Take 30 mg by mouth 3 (three) times daily., Disp: , Rfl:     cranberry 400 mg Cap, 1 capsule once daily., Disp: , Rfl:     cranberry 400 mg Cap, Take 1 capsule by mouth once daily., Disp: , Rfl:     dicyclomine (BENTYL) 20 mg tablet, Take 1 tablet (20 mg total) by mouth 4 (four) times daily., Disp: 50 tablet, Rfl: 2    enoxaparin (LOVENOX) 40 mg/0.4 mL Syrg, Inject 0.4 mLs (40 mg total) into the skin Daily., Disp: 2 each, Rfl: 0    flaxseed oiL Oil, 1 capsule by Other route once daily., Disp: , Rfl:     fluticasone propionate (FLONASE) 50 mcg/actuation nasal spray, 1 spray by Each Nostril route 2 (two) times daily., Disp: , Rfl:     garlic 1,000 mg Cap, 1 capsule once daily., Disp: , Rfl:     garlic 1,000 mg Cap, Take 1 capsule by mouth once daily., Disp: , Rfl:     glucosam reed dip-chondroit-C-Mn 178-048-07-3 mg Cap, 1 tablet once daily., Disp: , Rfl:     glucosam reed dip-chondroit-C-Mn 805-153-01-3 mg Cap, Take 1 tablet by mouth once daily., Disp: , Rfl:     hydroCHLOROthiazide (HYDRODIURIL) 25 MG tablet, Take 1 tablet (25 mg total) by mouth once daily., Disp: 90 tablet, Rfl: 3    hydrocortisone 2.5 % cream, Apply topically once daily., Disp: 15 g, Rfl: 2    LORazepam (ATIVAN) 0.5 MG tablet, Take 1 tablet (0.5 mg total) by mouth every 12 (twelve) hours as needed for Anxiety., Disp: 30 tablet, Rfl: 3    losartan (COZAAR) 50 MG tablet, Take 1 tablet (50 mg total) by mouth once daily., Disp: 90 tablet, Rfl: 3    MAGNESIUM GLYCINATE ORAL, Take 1 tablet by mouth once daily., Disp: , Rfl:     magnesium oxide (MAG-OX) 400 mg (241.3 mg magnesium) tablet, Take 400 mg by mouth once daily., Disp: , Rfl:      mv-mins-folic-lycopene-ginkgo 400-300-120 mcg-mcg-mg Tab, 1 tablet once daily., Disp: , Rfl:     PHYTOSTEROL ORAL, 1 tablet once daily., Disp: , Rfl:     PHYTOSTEROL ORAL, Take 1 tablet by mouth once daily., Disp: , Rfl:     turmeric, bulk, 100 % Powd, 1 tablet once daily., Disp: , Rfl:     turmeric, bulk, 100 % Powd, Take 1 tablet by mouth once daily., Disp: , Rfl:     vitamin B comp with C no.4 150 mg Tab, 1 tablet once daily., Disp: , Rfl:     vitamin D (VITAMIN D3) 1000 units Tab, Take 1,000 Units by mouth once daily., Disp: , Rfl:     albuterol (PROAIR HFA) 90 mcg/actuation inhaler, Inhale 2 puffs into the lungs every 6 (six) hours as needed for Wheezing. Rescue (Patient not taking: Reported on 4/2/2024), Disp: 18 g, Rfl: 0    aspirin (ECOTRIN) 81 MG EC tablet, Take 1 tablet (81 mg total) by mouth once daily., Disp: 30 tablet, Rfl: 3        Objective:       Physical Examination:     /70   Pulse 71   Temp 97.3 °F (36.3 °C)   Resp 16   Wt 109.8 kg (242 lb)   BMI 32.82 kg/m²     Physical Exam  Constitutional:       Appearance: Normal appearance.   HENT:      Head: Normocephalic and atraumatic.   Eyes:      General: No scleral icterus.     Conjunctiva/sclera: Conjunctivae normal.   Cardiovascular:      Rate and Rhythm: Normal rate.   Pulmonary:      Effort: Pulmonary effort is normal.   Abdominal:      General: Abdomen is flat.   Neurological:      General: No focal deficit present.      Mental Status: He is alert and oriented to person, place, and time.   Psychiatric:         Mood and Affect: Mood normal.         Behavior: Behavior normal.         Labs:   No results found for this or any previous visit (from the past 2 weeks).  CMP  Sodium   Date Value Ref Range Status   03/29/2024 138 135 - 146 mmol/L Final     Potassium   Date Value Ref Range Status   03/29/2024 4.0 3.5 - 5.3 mmol/L Final     Chloride   Date Value Ref Range Status   03/29/2024 102 98 - 110 mmol/L Final     CO2   Date Value Ref Range  "Status   03/29/2024 24 20 - 32 mmol/L Final     Glucose   Date Value Ref Range Status   03/29/2024 106 (H) 65 - 99 mg/dL Final     Comment:                   Fasting reference interval     For someone without known diabetes, a glucose value  between 100 and 125 mg/dL is consistent with  prediabetes and should be confirmed with a  follow-up test.          BUN   Date Value Ref Range Status   03/29/2024 25 7 - 25 mg/dL Final     Creatinine   Date Value Ref Range Status   03/29/2024 1.31 0.70 - 1.35 mg/dL Final     Calcium   Date Value Ref Range Status   03/29/2024 9.8 8.6 - 10.3 mg/dL Final     Total Protein   Date Value Ref Range Status   03/29/2024 7.0 6.1 - 8.1 g/dL Final     Albumin   Date Value Ref Range Status   03/29/2024 4.6 3.6 - 5.1 g/dL Final     Total Bilirubin   Date Value Ref Range Status   03/29/2024 1.0 0.2 - 1.2 mg/dL Final     AST   Date Value Ref Range Status   03/29/2024 22 10 - 35 U/L Final     ALT   Date Value Ref Range Status   03/29/2024 21 9 - 46 U/L Final     eGFR if    Date Value Ref Range Status   03/11/2022 64 > OR = 60 mL/min/1.73m2 Final     eGFR if non    Date Value Ref Range Status   03/11/2022 56 (L) > OR = 60 mL/min/1.73m2 Final     No results found for: "CEA"  No results found for: "PSA"        Assessment/Plan:     Problem List Items Addressed This Visit       Homozygous Factor V Leiden mutation - Primary     Patient is doing well and has not had a clotting event.  Will continue on ASA and continue to see him on a yearly basis.  Will continue the use of lovenox in high risk situations as previous.  Also continue to assist with high risk situations/clearances as needed.                Discussion:     Follow up in about 1 year (around 12/17/2025).      Electronically signed by Lg Costa        "

## 2025-04-02 ENCOUNTER — TELEPHONE (OUTPATIENT)
Dept: FAMILY MEDICINE | Facility: CLINIC | Age: 65
End: 2025-04-02
Payer: COMMERCIAL

## 2025-04-22 ENCOUNTER — OFFICE VISIT (OUTPATIENT)
Dept: FAMILY MEDICINE | Facility: CLINIC | Age: 65
End: 2025-04-22
Payer: COMMERCIAL

## 2025-04-22 VITALS
SYSTOLIC BLOOD PRESSURE: 128 MMHG | WEIGHT: 254 LBS | HEART RATE: 70 BPM | OXYGEN SATURATION: 98 % | DIASTOLIC BLOOD PRESSURE: 70 MMHG | BODY MASS INDEX: 34.4 KG/M2 | HEIGHT: 72 IN

## 2025-04-22 DIAGNOSIS — Z23 NEED FOR TETANUS BOOSTER: ICD-10-CM

## 2025-04-22 DIAGNOSIS — D68.51 HOMOZYGOUS FACTOR V LEIDEN MUTATION: ICD-10-CM

## 2025-04-22 DIAGNOSIS — I10 ESSENTIAL HYPERTENSION: ICD-10-CM

## 2025-04-22 DIAGNOSIS — B35.1 ONYCHOMYCOSIS: ICD-10-CM

## 2025-04-22 DIAGNOSIS — M54.50 LUMBAR BACK PAIN: ICD-10-CM

## 2025-04-22 DIAGNOSIS — K21.9 GASTROESOPHAGEAL REFLUX DISEASE WITHOUT ESOPHAGITIS: ICD-10-CM

## 2025-04-22 DIAGNOSIS — K58.9 IRRITABLE BOWEL SYNDROME WITHOUT DIARRHEA: ICD-10-CM

## 2025-04-22 DIAGNOSIS — F41.1 GENERALIZED ANXIETY DISORDER: ICD-10-CM

## 2025-04-22 DIAGNOSIS — E78.2 MIXED HYPERLIPIDEMIA: ICD-10-CM

## 2025-04-22 DIAGNOSIS — J32.4 PANSINUSITIS, UNSPECIFIED CHRONICITY: ICD-10-CM

## 2025-04-22 DIAGNOSIS — M23.91 INTERNAL DERANGEMENT OF RIGHT KNEE: ICD-10-CM

## 2025-04-22 DIAGNOSIS — G47.33 OBSTRUCTIVE SLEEP APNEA: ICD-10-CM

## 2025-04-22 DIAGNOSIS — Z00.00 WELLNESS EXAMINATION: Primary | ICD-10-CM

## 2025-04-22 PROCEDURE — 90714 TD VACC NO PRESV 7 YRS+ IM: CPT | Mod: S$GLB,,, | Performed by: FAMILY MEDICINE

## 2025-04-22 PROCEDURE — 99214 OFFICE O/P EST MOD 30 MIN: CPT | Mod: 25,S$GLB,, | Performed by: FAMILY MEDICINE

## 2025-04-22 PROCEDURE — 4010F ACE/ARB THERAPY RXD/TAKEN: CPT | Mod: CPTII,S$GLB,, | Performed by: FAMILY MEDICINE

## 2025-04-22 PROCEDURE — 3078F DIAST BP <80 MM HG: CPT | Mod: CPTII,S$GLB,, | Performed by: FAMILY MEDICINE

## 2025-04-22 PROCEDURE — 90471 IMMUNIZATION ADMIN: CPT | Mod: S$GLB,,, | Performed by: FAMILY MEDICINE

## 2025-04-22 PROCEDURE — 3074F SYST BP LT 130 MM HG: CPT | Mod: CPTII,S$GLB,, | Performed by: FAMILY MEDICINE

## 2025-04-22 PROCEDURE — 1159F MED LIST DOCD IN RCRD: CPT | Mod: CPTII,S$GLB,, | Performed by: FAMILY MEDICINE

## 2025-04-22 PROCEDURE — 3008F BODY MASS INDEX DOCD: CPT | Mod: CPTII,S$GLB,, | Performed by: FAMILY MEDICINE

## 2025-04-22 RX ORDER — LORAZEPAM 0.5 MG/1
0.5 TABLET ORAL EVERY 12 HOURS PRN
Qty: 30 TABLET | Refills: 3 | Status: SHIPPED | OUTPATIENT
Start: 2025-04-22

## 2025-04-22 RX ORDER — OMEPRAZOLE 20 MG/1
20 CAPSULE, DELAYED RELEASE ORAL DAILY
Qty: 90 CAPSULE | Refills: 3 | Status: SHIPPED | OUTPATIENT
Start: 2025-04-22 | End: 2026-04-22

## 2025-04-22 RX ORDER — ASPIRIN 81 MG/1
81 TABLET ORAL DAILY
Qty: 30 TABLET | Refills: 3 | Status: SHIPPED | OUTPATIENT
Start: 2025-04-22 | End: 2025-08-20

## 2025-04-22 RX ORDER — CETIRIZINE HYDROCHLORIDE 10 MG/1
10 TABLET ORAL DAILY
Qty: 90 TABLET | Refills: 3 | Status: SHIPPED | OUTPATIENT
Start: 2025-04-22 | End: 2026-04-22

## 2025-04-22 RX ORDER — IBUPROFEN 800 MG/1
800 TABLET ORAL 3 TIMES DAILY
Qty: 60 TABLET | Refills: 3 | Status: SHIPPED | OUTPATIENT
Start: 2025-04-22

## 2025-04-22 NOTE — PROGRESS NOTES
SUBJECTIVE:    Patient ID: Otto Dawson is a 64 y.o. male.    Chief Complaint: Annual Exam (No bottles//Pt is here for his annual wellness visit and medication refills//MARILEE )    HPI    History of Present Illness    CHIEF COMPLAINT:  Otto presents today for follow-up regarding weight gain    WEIGHT MANAGEMENT:  He reports gaining 12 lbs since last visit. He previously achieved weight loss of 17 lbs over 3 months through intermittent fasting and dietary modifications. Current weight gain is attributed to poor dietary choices and frequent consumption of fast food during late night hours due to demanding work schedule.    MUSCULOSKELETAL:  He reports new left-sided sciatic pain for the past 3-4 months, described as sudden, fast, and intense, causing weakness and near falls. He has a history of right-sided sciatic pain present intermittently for the past year. He also experiences joint pain in hands, knees, and back, particularly after increased work activity, which improves after rest. He manages pain with ibuprofen.    MEDICATIONS:  He takes 4 blood pressure medications. He uses magnesium glycinate, switched approximately one year ago for mood disorders and improved absorption. He takes Zyrtec seasonally for allergies with periodic breaks during reduced symptoms. He uses lorazepam rarely for panic attacks.    PAST MEDICAL HISTORY:  He has a history of panic attacks, predominantly occurring in the morning upon awakening, with occasional episodes in public settings such as department stores with associated claustrophobia. Former smoker, quit in 1998.      ROS:  Constitutional: -appetite change, -chills, -fatigue, -fever, -unexpected weight change, +weight gain, +difficulty staying asleep, +sleep disturbances, +difficulty falling asleep  HENT: -ear pain, -trouble swallowing  Eyes: -pain, -discharge, -visual disturbance  Respiratory: -apnea, -cough, +shortness of breath, -wheezing, +exertional  dyspnea  Cardiovascular: -chest pain, -leg swelling  Gastrointestinal: -abdominal pain, -blood in stool, -constipation, -diarrhea, -nausea, -vomiting, -reflux  Endocrine: -cold intolerance, -heat intolerance, -polydipsia  Genitourinary: -bladder incontinence, -dysuria, -erectile dysfunction, -frequency, -hematuria, -testicular pain, -urgency, -nocturia  Musculoskeletal: -gait problem, +joint swelling, -myalgia, +joint pain, +back pain, +nerve pain, +limb swelling  Neurological: -dizziness, -seizures, -numbness  Psychiatric/Behavioral: -agitation, -hallucinations, -nervous, -anxiety symptoms  Allergic: +allergic reactions  Psychiatric: +panic attacks         No visits with results within 6 Month(s) from this visit.   Latest known visit with results is:   Office Visit on 09/25/2023   Component Date Value Ref Range Status    WBC 03/29/2024 4.7  3.8 - 10.8 Thousand/uL Final    RBC 03/29/2024 4.76  4.20 - 5.80 Million/uL Final    Hemoglobin 03/29/2024 14.6  13.2 - 17.1 g/dL Final    Hematocrit 03/29/2024 42.4  38.5 - 50.0 % Final    MCV 03/29/2024 89.1  80.0 - 100.0 fL Final    MCH 03/29/2024 30.7  27.0 - 33.0 pg Final    MCHC 03/29/2024 34.4  32.0 - 36.0 g/dL Final    RDW 03/29/2024 12.5  11.0 - 15.0 % Final    Platelets 03/29/2024 227  140 - 400 Thousand/uL Final    MPV 03/29/2024 10.6  7.5 - 12.5 fL Final    Neutrophils, Abs 03/29/2024 2,707  1,500 - 7,800 cells/uL Final    Lymph # 03/29/2024 1,147  850 - 3,900 cells/uL Final    Mono # 03/29/2024 555  200 - 950 cells/uL Final    Eos # 03/29/2024 249  15 - 500 cells/uL Final    Baso # 03/29/2024 42  0 - 200 cells/uL Final    Neutrophils Relative 03/29/2024 57.6  % Final    Lymph % 03/29/2024 24.4  % Final    Mono % 03/29/2024 11.8  % Final    Eosinophil % 03/29/2024 5.3  % Final    Basophil % 03/29/2024 0.9  % Final    Glucose 03/29/2024 106 (H)  65 - 99 mg/dL Final    BUN 03/29/2024 25  7 - 25 mg/dL Final    Creatinine 03/29/2024 1.31  0.70 - 1.35 mg/dL Final    eGFR  03/29/2024 62  > OR = 60 mL/min/1.73m2 Final    BUN/Creatinine Ratio 03/29/2024 SEE NOTE:  6 - 22 (calc) Final    Sodium 03/29/2024 138  135 - 146 mmol/L Final    Potassium 03/29/2024 4.0  3.5 - 5.3 mmol/L Final    Chloride 03/29/2024 102  98 - 110 mmol/L Final    CO2 03/29/2024 24  20 - 32 mmol/L Final    Calcium 03/29/2024 9.8  8.6 - 10.3 mg/dL Final    Total Protein 03/29/2024 7.0  6.1 - 8.1 g/dL Final    Albumin 03/29/2024 4.6  3.6 - 5.1 g/dL Final    Globulin, Total 03/29/2024 2.4  1.9 - 3.7 g/dL (calc) Final    Albumin/Globulin Ratio 03/29/2024 1.9  1.0 - 2.5 (calc) Final    Total Bilirubin 03/29/2024 1.0  0.2 - 1.2 mg/dL Final    Alkaline Phosphatase 03/29/2024 69  35 - 144 U/L Final    AST 03/29/2024 22  10 - 35 U/L Final    ALT 03/29/2024 21  9 - 46 U/L Final    Cholesterol 03/29/2024 182  <200 mg/dL Final    HDL 03/29/2024 38 (L)  > OR = 40 mg/dL Final    Triglycerides 03/29/2024 128  <150 mg/dL Final    LDL Cholesterol 03/29/2024 120 (H)  mg/dL (calc) Final    HDL/Cholesterol Ratio 03/29/2024 4.8  <5.0 (calc) Final    Non HDL Chol. (LDL+VLDL) 03/29/2024 144 (H)  <130 mg/dL (calc) Final    Color, UA 03/29/2024 TNP   Final    TSH w/reflex to FT4 03/29/2024 2.47  0.40 - 4.50 mIU/L Final    PROSTATE SPECIFIC ANTIGEN, SCR - Q* 03/29/2024 0.31  < OR = 4.00 ng/mL Final       Past Medical History:   Diagnosis Date    Anxiety     Arthritis     Depression     Factor 5 Leiden mutation, heterozygous     Gastritis 9/16/2019    Generalized anxiety disorder     History of nuclear stress test 2014    Hyperlipidemia     Hypertension      Social History[1]  Past Surgical History:   Procedure Laterality Date    COLONOSCOPY  2018    Dr Don-rtc 5 yrs     HERNIA REPAIR      SINUS SURGERY  2018    nasal septoplasty/balloon sinuplasty- Dr Roman     Family History   Problem Relation Name Age of Onset    Heart disease Father         The 10-year CVD risk score (Eda et al., 2008) is: 23.1%    Values used to calculate the  score:      Age: 64 years      Sex: Male      Diabetic: No      Tobacco smoker: No      Systolic Blood Pressure: 128 mmHg      Is BP treated: Yes      HDL Cholesterol: 38 mg/dL      Total Cholesterol: 182 mg/dL    All of your core healthy metrics are met.      Review of patient's allergies indicates:   Allergen Reactions    Cefuroxime axetil     Ciprofloxacin Other (See Comments)    [place] Itching    Sulfacetamide sodium-sulfur Other (See Comments)    Tetracycline hcl Other (See Comments)     Current Medications[2]    Review of Systems   Constitutional:  Negative for appetite change, chills, fatigue, fever and unexpected weight change.   HENT:  Negative for ear pain and trouble swallowing.    Eyes:  Negative for pain, discharge and visual disturbance.   Respiratory:  Negative for apnea, cough, shortness of breath and wheezing.    Cardiovascular:  Negative for chest pain and leg swelling.   Gastrointestinal:  Negative for abdominal pain, blood in stool, constipation, diarrhea, nausea, vomiting and reflux.   Endocrine: Negative for cold intolerance, heat intolerance and polydipsia.   Genitourinary:  Negative for bladder incontinence, dysuria, erectile dysfunction, frequency, hematuria, testicular pain and urgency.   Musculoskeletal:  Positive for back pain. Negative for gait problem, joint swelling and myalgias.   Neurological:  Negative for dizziness, seizures and numbness.   Psychiatric/Behavioral:  Positive for sleep disturbance. Negative for agitation, behavioral problems and hallucinations. The patient is nervous/anxious.            Objective:      Vitals:    04/22/25 1116   BP: 128/70   Pulse: 70   SpO2: 98%   Weight: 115.2 kg (254 lb)   Height: 6' (1.829 m)     Physical Exam  Vitals and nursing note reviewed.   Constitutional:       General: He is not in acute distress.     Appearance: Normal appearance. He is well-developed. He is not toxic-appearing.   HENT:      Head: Normocephalic and atraumatic.      Right  Ear: Tympanic membrane and external ear normal.      Left Ear: Tympanic membrane and external ear normal.      Nose: Nose normal.      Mouth/Throat:      Pharynx: Oropharynx is clear. No posterior oropharyngeal erythema.   Eyes:      Pupils: Pupils are equal, round, and reactive to light.   Neck:      Thyroid: No thyromegaly.      Vascular: No carotid bruit.   Cardiovascular:      Rate and Rhythm: Normal rate and regular rhythm.      Heart sounds: Normal heart sounds. No murmur heard.  Pulmonary:      Effort: Pulmonary effort is normal.      Breath sounds: Normal breath sounds. No wheezing or rales.   Abdominal:      General: Bowel sounds are normal. There is no distension.      Palpations: Abdomen is soft.      Tenderness: There is no abdominal tenderness.   Musculoskeletal:         General: No tenderness or deformity. Normal range of motion.      Cervical back: Normal range of motion and neck supple.      Lumbar back: Normal. No spasms.      Comments: Bends 90 degrees at  waist, shoulders and knees have full range of motion, no pitting edema to lower extremities, tender to palpation of the lower back pain of the L4-L5 area   Lymphadenopathy:      Cervical: No cervical adenopathy.   Skin:     General: Skin is warm and dry.      Findings: No rash.      Comments: Shallow finger laceration seen on the index finger.  Right hand   Neurological:      General: No focal deficit present.      Mental Status: He is alert and oriented to person, place, and time. Mental status is at baseline.      Cranial Nerves: No cranial nerve deficit.      Coordination: Coordination normal.   Psychiatric:         Mood and Affect: Mood normal.         Behavior: Behavior normal.         Thought Content: Thought content normal.         Judgment: Judgment normal.       Physical Exam    Cardiovascular: Normal heart sounds. No murmur heard.  Pulmonary: Normal breath sounds.  Musculoskeletal: Swelling in right ankle.             Assessment:        1. Wellness examination    2. Need for tetanus booster    3. Pansinusitis, unspecified chronicity    4. Homozygous Factor V Leiden mutation    5. Generalized anxiety disorder    6. Internal derangement of right knee    7. Gastroesophageal reflux disease without esophagitis    8. Lumbar back pain    9. Onychomycosis    10. Essential hypertension    11. Mixed hyperlipidemia    12. Obstructive sleep apnea    13. Irritable bowel syndrome without diarrhea         Plan:       Wellness examination  Wellness lab work ordered  Need for tetanus booster  -     diptheria-tetanus toxoids 2-2 Lf unit/0.5 mL injection 0.5 mL  Tetanus booster today due to laceration  Pansinusitis, unspecified chronicity  -     cetirizine (ZYRTEC) 10 MG tablet; Take 1 tablet (10 mg total) by mouth once daily.  Dispense: 90 tablet; Refill: 3    Homozygous Factor V Leiden mutation  -     aspirin (ECOTRIN) 81 MG EC tablet; Take 1 tablet (81 mg total) by mouth once daily.  Dispense: 30 tablet; Refill: 3    Generalized anxiety disorder  -     LORazepam (ATIVAN) 0.5 MG tablet; Take 1 tablet (0.5 mg total) by mouth every 12 (twelve) hours as needed for Anxiety.  Dispense: 30 tablet; Refill: 3    Internal derangement of right knee  -     ibuprofen (ADVIL,MOTRIN) 800 MG tablet; Take 1 tablet (800 mg total) by mouth 3 (three) times daily.  Dispense: 60 tablet; Refill: 3    Gastroesophageal reflux disease without esophagitis  -     omeprazole (PRILOSEC) 20 MG capsule; Take 1 capsule (20 mg total) by mouth once daily.  Dispense: 90 capsule; Refill: 3    Lumbar back pain  -     Ambulatory referral/consult to Chiropractic; Future; Expected date: 04/29/2025  Refer to Dr. Madden chiropractor for evaluation  Onychomycosis    Essential hypertension  Blood pressure well controlled  Mixed hyperlipidemia    Obstructive sleep apnea  Patient has gained 12 lb lately does have significant snoring., daytime fatigue.  We will need sleep study to evaluate for obstructive  sleep apnea  Irritable bowel syndrome without diarrhea      Assessment & Plan    I10 Essential (primary) hypertension  F40.01 Agoraphobia with panic disorder  M54.32 Sciatica, left side  M54.31 Sciatica, right side  M25.571 Pain in right ankle and joints of right foot  R22.41 Localized swelling, mass and lump, right lower limb  J30.9 Allergic rhinitis, unspecified  Z87.891 Personal history of nicotine dependence    IMPRESSION:  - Assessed weight gain and associated health risks, explaining importance of maintaining a healthy weight for joint health and overall wellbeing.  - Evaluated joint pain, particularly in knees and back, considering impact of weight on symptoms.  - Reviewed BP management, noting current control with medication regimen.  - Discussed relationship between magnesium intake and BP management.  - Considered risk factors for infectious diseases, determining low risk for measles due to likely childhood vaccination.  - Noted ongoing issues with acid reflux, likely exacerbated by recent dietary changes.  - Assessed ankle swelling, attributing to previous injuries but not currently limiting function.    ESSENTIAL HYPERTENSION:  - Evaluated blood pressure, which is currently well-controlled.  - Continued current antihypertensive regimen, including atenolol and three other medications, with prescriptions valid until October.  - Scheduled follow up in October for blood pressure medication review and potential renewal.  - Instructed the patient to contact the office if the pharmacy reports any issues with current blood pressure medication refills.  - Initiated low-dose aspirin to be taken nightly.    PANIC DISORDER WITH AGORAPHOBIA:  - Continued lorazepam for occasional use during panic attacks.  - Renewed prescription for lorazepam to be taken as needed for panic attacks.  - Noted that the patient experiences panic attacks, most frequently upon waking in the morning, but also occasionally in public  "places.    LEFT-SIDED SCIATICA:  - Noted new onset of left-sided sciatica in the last 3-4 months.  - Observed that the patient experiences intense, sudden pain causing weakness and near-falling.  - Advised that weight loss would help reduce pressure on the spine and alleviate symptoms.  - Prescribed ibuprofen 800mg for pain relief, to be taken with food and omeprazole.  - Referred the patient to Dr. Madden, chiropractor, for evaluation and potential treatment of back pain.    RIGHT-SIDED SCIATICA:  - Noted history of right-sided sciatica for approximately 1 year.  - Prescribed the same treatment as for left-sided sciatica: referral to chiropractor and ibuprofen 800mg for pain relief.    PAIN AND SWELLING IN RIGHT ANKLE:  - Noted history of multiple right ankle twists and sprains.  - Observed visible swelling in the right ankle compared to the left ankle.  - Confirmed swelling is due to previous injuries.  - Observed noticeable swelling in the right ankle.  - Confirmed swelling in right ankle compared to left upon exam.  - Attributed swelling to previous injuries from twisting and spraining.  - Recommend ibuprofen for pain management, as prescribed for ankle pain.    ALLERGIC RHINITIS:  - Continued Zyrtec for allergy management.  - Noted that the patient experiences allergy symptoms for most of the year, with morning rhinorrhea during peak seasons.    HISTORY OF NICOTINE DEPENDENCE:  - Documented that the patient reports quitting smoking in 1998.    GEN  [facility] HEALTH MANAGEMENT:  - Initiated ibuprofen 800 mg to be taken with breakfast, lunch, and dinner as needed for pain, explaining potential risks of long-term use without gastric protection.  - Started omeprazole to be taken concurrently with ibuprofen for gastric protection.  - Educated on concept of "[employer]" and their potential health benefits, recommending incorporation into diet.  - Otto to continue gardening and [employer] activities as tolerated, " being mindful of joint pain.  - Provided information on current measles outbreak and vaccination recommendations based on age groups.  - Administered tetanus booster shot in office due to recent minor injury.  - Message or call the office if there are any concerns about medication refills or new symptoms.         Follow up in about 6 months (around 10/22/2025), or gerd  htn.        This note was generated with the assistance of ambient listening technology. Verbal consent was obtained by the patient and accompanying visitor(s) for the recording of patient appointment to facilitate this note. I attest to having reviewed and edited the generated note for accuracy, though some syntax or spelling errors may persist. Please contact the author of this note for any clarification.      [address]           [1]   Social History  Socioeconomic History    Marital status: Single   Tobacco Use    Smoking status: Former     Current packs/day: 0.00     Types: Cigarettes     Quit date:      Years since quittin.3    Smokeless tobacco: Never   Substance and Sexual Activity    Alcohol use: Yes    Drug use: Never   [2]   Current Outpatient Medications:     5-hydroxytryptophan, 5-HTP, 50 mg Cap, Take 1 capsule by mouth nightly., Disp: , Rfl:     amLODIPine (NORVASC) 10 MG tablet, Take 1 tablet (10 mg total) by mouth once daily., Disp: 90 tablet, Rfl: 3    ascorbic acid, vitamin C, (VITAMIN C) 1000 MG tablet, 1 tablet once daily., Disp: , Rfl:     ascorbic acid, vitamin C, (VITAMIN C) 1000 MG tablet, Take 1 tablet by mouth once daily., Disp: , Rfl:     aspirin (ECOTRIN) 81 MG EC tablet, Take 1 tablet (81 mg total) by mouth once daily., Disp: 30 tablet, Rfl: 3    atenoloL (TENORMIN) 50 MG tablet, Take 1 tablet (50 mg total) by mouth 2 (two) times daily., Disp: 180 tablet, Rfl: 3    b complex vitamins capsule, Take 1 tablet by mouth once daily., Disp: , Rfl:     CALCIUM-MAGNESIUM ORAL, 1 tablet Daily., Disp: , Rfl:      cetirizine (ZYRTEC) 10 MG tablet, Take 1 tablet (10 mg total) by mouth once daily., Disp: 90 tablet, Rfl: 3    cinnamon bark 500 mg capsule, 1 tablet Daily., Disp: , Rfl:     cinnamon bark 500 mg capsule, Take 1 tablet by mouth once daily., Disp: , Rfl:     clotrimazole-betamethasone 1-0.05% (LOTRISONE) cream, Apply topically 2 (two) times daily., Disp: 30 g, Rfl: 2    co-enzyme Q-10 30 mg capsule, Take 30 mg by mouth 3 (three) times daily., Disp: , Rfl:     cranberry 400 mg Cap, 1 capsule once daily., Disp: , Rfl:     cranberry 400 mg Cap, Take 1 capsule by mouth once daily., Disp: , Rfl:     dicyclomine (BENTYL) 20 mg tablet, Take 1 tablet (20 mg total) by mouth 4 (four) times daily., Disp: 50 tablet, Rfl: 2    enoxaparin (LOVENOX) 40 mg/0.4 mL Syrg, Inject 0.4 mLs (40 mg total) into the skin Daily., Disp: 2 each, Rfl: 0    flaxseed oiL Oil, 1 capsule by Other route once daily., Disp: , Rfl:     fluticasone propionate (FLONASE) 50 mcg/actuation nasal spray, 1 spray by Each Nostril route 2 (two) times daily., Disp: , Rfl:     garlic 1,000 mg Cap, 1 capsule once daily., Disp: , Rfl:     garlic 1,000 mg Cap, Take 1 capsule by mouth once daily., Disp: , Rfl:     glucosam reed dip-chondroit-C-Mn 812-354-63-3 mg Cap, 1 tablet once daily., Disp: , Rfl:     glucosam reed dip-chondroit-C-Mn 480-390-69-3 mg Cap, Take 1 tablet by mouth once daily., Disp: , Rfl:     hydroCHLOROthiazide (HYDRODIURIL) 25 MG tablet, Take 1 tablet (25 mg total) by mouth once daily., Disp: 90 tablet, Rfl: 3    hydrocortisone 2.5 % cream, Apply topically once daily., Disp: 15 g, Rfl: 2    ibuprofen (ADVIL,MOTRIN) 800 MG tablet, Take 1 tablet (800 mg total) by mouth 3 (three) times daily., Disp: 60 tablet, Rfl: 3    LORazepam (ATIVAN) 0.5 MG tablet, Take 1 tablet (0.5 mg total) by mouth every 12 (twelve) hours as needed for Anxiety., Disp: 30 tablet, Rfl: 3    losartan (COZAAR) 50 MG tablet, Take 1 tablet (50 mg total) by mouth once daily., Disp: 90  tablet, Rfl: 3    MAGNESIUM GLYCINATE ORAL, Take 1 tablet by mouth once daily., Disp: , Rfl:     magnesium oxide (MAG-OX) 400 mg (241.3 mg magnesium) tablet, Take 400 mg by mouth once daily., Disp: , Rfl:     mv-mins-folic-lycopene-ginkgo 400-300-120 mcg-mcg-mg Tab, 1 tablet once daily., Disp: , Rfl:     omeprazole (PRILOSEC) 20 MG capsule, Take 1 capsule (20 mg total) by mouth once daily., Disp: 90 capsule, Rfl: 3    PHYTOSTEROL ORAL, 1 tablet once daily., Disp: , Rfl:     PHYTOSTEROL ORAL, Take 1 tablet by mouth once daily., Disp: , Rfl:     turmeric, bulk, 100 % Powd, 1 tablet once daily., Disp: , Rfl:     turmeric, bulk, 100 % Powd, Take 1 tablet by mouth once daily., Disp: , Rfl:     vitamin B comp with C no.4 150 mg Tab, 1 tablet once daily., Disp: , Rfl:     vitamin D (VITAMIN D3) 1000 units Tab, Take 1,000 Units by mouth once daily., Disp: , Rfl:

## 2025-04-23 ENCOUNTER — TELEPHONE (OUTPATIENT)
Dept: FAMILY MEDICINE | Facility: CLINIC | Age: 65
End: 2025-04-23
Payer: COMMERCIAL

## 2025-04-23 DIAGNOSIS — G47.33 OBSTRUCTIVE SLEEP APNEA: Primary | ICD-10-CM

## 2025-04-23 PROBLEM — M23.91 INTERNAL DERANGEMENT OF RIGHT KNEE: Status: ACTIVE | Noted: 2025-04-23

## 2025-04-23 PROBLEM — M54.50 LUMBAR BACK PAIN: Status: ACTIVE | Noted: 2025-04-23

## 2025-04-23 NOTE — TELEPHONE ENCOUNTER
----- Message from Janice sent at 4/23/2025  2:01 PM CDT -----  Forgot to speak with Dr. Norman about being tested for sleep apnea. In which the company that was given to him  does not accept patient insurance. Patient looking for a place that would accept his insurance. [phone]

## 2025-04-24 ENCOUNTER — TELEPHONE (OUTPATIENT)
Dept: FAMILY MEDICINE | Facility: CLINIC | Age: 65
End: 2025-04-24
Payer: COMMERCIAL

## 2025-04-24 NOTE — TELEPHONE ENCOUNTER
----- Message from Iona sent at 4/24/2025  3:19 PM CDT -----  The sleep study provider that was referred to is not covered under his [employer] insurance. Can you refer him to one that is a [employer] provider. Please advise. Pt #[phone]

## 2025-04-24 NOTE — TELEPHONE ENCOUNTER
Spoke to pt and let him know Dr. Norman put an order into [facility] sleep lab. Pt verbalized understanding

## 2025-07-09 DIAGNOSIS — I10 ESSENTIAL HYPERTENSION: ICD-10-CM

## 2025-07-09 RX ORDER — ATENOLOL 50 MG/1
50 TABLET ORAL 2 TIMES DAILY
Qty: 180 TABLET | Refills: 3 | Status: SHIPPED | OUTPATIENT
Start: 2025-07-09

## 2025-07-09 RX ORDER — HYDROCHLOROTHIAZIDE 25 MG/1
25 TABLET ORAL DAILY
Qty: 90 TABLET | Refills: 3 | Status: SHIPPED | OUTPATIENT
Start: 2025-07-09

## 2025-07-09 RX ORDER — AMLODIPINE BESYLATE 10 MG/1
10 TABLET ORAL DAILY
Qty: 90 TABLET | Refills: 3 | Status: SHIPPED | OUTPATIENT
Start: 2025-07-09

## 2025-07-09 RX ORDER — LOSARTAN POTASSIUM 50 MG/1
50 TABLET ORAL DAILY
Qty: 90 TABLET | Refills: 3 | Status: SHIPPED | OUTPATIENT
Start: 2025-07-09

## 2025-07-09 NOTE — TELEPHONE ENCOUNTER
----- Message from Jermaine Fish sent at 7/9/2025  9:05 AM CDT -----  Pt requesting refills on amlodipine,atenolol,losartan and hydrochlorothiazide please send to [facility] on [address] please call to let him know its being sent     Pt # [phone]